# Patient Record
Sex: FEMALE | Race: WHITE | NOT HISPANIC OR LATINO | Employment: PART TIME | URBAN - METROPOLITAN AREA
[De-identification: names, ages, dates, MRNs, and addresses within clinical notes are randomized per-mention and may not be internally consistent; named-entity substitution may affect disease eponyms.]

---

## 2020-07-30 ENCOUNTER — OFFICE VISIT (OUTPATIENT)
Dept: URGENT CARE | Facility: CLINIC | Age: 30
End: 2020-07-30
Payer: COMMERCIAL

## 2020-07-30 VITALS
DIASTOLIC BLOOD PRESSURE: 61 MMHG | RESPIRATION RATE: 18 BRPM | OXYGEN SATURATION: 97 % | SYSTOLIC BLOOD PRESSURE: 99 MMHG | HEART RATE: 82 BPM | HEIGHT: 63 IN | TEMPERATURE: 97.7 F | BODY MASS INDEX: 22.79 KG/M2 | WEIGHT: 128.6 LBS

## 2020-07-30 DIAGNOSIS — N39.0 URINARY TRACT INFECTION WITHOUT HEMATURIA, SITE UNSPECIFIED: Primary | ICD-10-CM

## 2020-07-30 DIAGNOSIS — J30.9 ALLERGIC RHINITIS, UNSPECIFIED SEASONALITY, UNSPECIFIED TRIGGER: ICD-10-CM

## 2020-07-30 LAB
SL AMB  POCT GLUCOSE, UA: NEGATIVE
SL AMB LEUKOCYTE ESTERASE,UA: ABNORMAL
SL AMB POCT BILIRUBIN,UA: NEGATIVE
SL AMB POCT BLOOD,UA: ABNORMAL
SL AMB POCT CLARITY,UA: ABNORMAL
SL AMB POCT COLOR,UA: YELLOW
SL AMB POCT KETONES,UA: NEGATIVE
SL AMB POCT NITRITE,UA: NEGATIVE
SL AMB POCT PH,UA: 5
SL AMB POCT SPECIFIC GRAVITY,UA: 1.02
SL AMB POCT URINE PROTEIN: ABNORMAL
SL AMB POCT UROBILINOGEN: 0.2

## 2020-07-30 PROCEDURE — 81002 URINALYSIS NONAUTO W/O SCOPE: CPT | Performed by: PHYSICIAN ASSISTANT

## 2020-07-30 PROCEDURE — 87491 CHLMYD TRACH DNA AMP PROBE: CPT | Performed by: PHYSICIAN ASSISTANT

## 2020-07-30 PROCEDURE — 99213 OFFICE O/P EST LOW 20 MIN: CPT | Performed by: PHYSICIAN ASSISTANT

## 2020-07-30 PROCEDURE — S9088 SERVICES PROVIDED IN URGENT: HCPCS | Performed by: PHYSICIAN ASSISTANT

## 2020-07-30 PROCEDURE — 87591 N.GONORRHOEAE DNA AMP PROB: CPT | Performed by: PHYSICIAN ASSISTANT

## 2020-07-30 PROCEDURE — 87086 URINE CULTURE/COLONY COUNT: CPT | Performed by: PHYSICIAN ASSISTANT

## 2020-07-30 RX ORDER — TRAZODONE HYDROCHLORIDE 50 MG/1
100 TABLET ORAL
COMMUNITY

## 2020-07-30 RX ORDER — FLUTICASONE PROPIONATE 50 MCG
1 SPRAY, SUSPENSION (ML) NASAL DAILY
Qty: 1 BOTTLE | Refills: 0 | Status: SHIPPED | OUTPATIENT
Start: 2020-07-30

## 2020-07-30 RX ORDER — NITROFURANTOIN 25; 75 MG/1; MG/1
100 CAPSULE ORAL 2 TIMES DAILY
Qty: 14 CAPSULE | Refills: 0 | Status: SHIPPED | OUTPATIENT
Start: 2020-07-30 | End: 2020-08-06

## 2020-07-30 RX ORDER — LORATADINE 10 MG/1
10 TABLET ORAL DAILY
Qty: 30 TABLET | Refills: 0 | Status: SHIPPED | OUTPATIENT
Start: 2020-07-30

## 2020-07-30 RX ORDER — ARIPIPRAZOLE 10 MG/1
5 TABLET ORAL DAILY
COMMUNITY

## 2020-07-30 RX ORDER — FLUOXETINE HYDROCHLORIDE 40 MG/1
40 CAPSULE ORAL DAILY
COMMUNITY

## 2020-07-30 NOTE — PROGRESS NOTES
[facility]        NAME: Alexander Licea is a 30 y o  female  : 1990    MRN: [id number]  DATE: 2020  TIME: 5:46 PM    Assessment and Plan   Urinary tract infection without hematuria, site unspecified [N39 0]  1  Urinary tract infection without hematuria, site unspecified  POCT urine dip    Urine culture    Chlamydia/GC amplified DNA by PCR    nitrofurantoin (MACROBID) 100 mg capsule    fluticasone (Flonase) 50 mcg/act nasal spray    loratadine (CLARITIN) 10 mg tablet   2  Allergic rhinitis, unspecified seasonality, unspecified trigger       Patient Instructions     Take medicine as prescribed  Follow up with PCP in 3-5 days  Proceed to  ER if symptoms worsen  Chief Complaint     Chief Complaint   Patient presents with    Painful Urination     c/o of painful urination for the past few days  History of Present Illness       Urinary Tract Infection    This is a new problem  The current episode started yesterday  The problem occurs every urination  The problem has been gradually worsening  The quality of the pain is described as burning  The pain is moderate  There has been no fever  She is sexually active  There is no history of pyelonephritis  Pertinent negatives include no chills, discharge, flank pain, frequency, hematuria, hesitancy, nausea, possible pregnancy, sweats, urgency or vomiting  She has tried nothing for the symptoms  There is no history of catheterization, kidney stones, recurrent UTIs, a single kidney, urinary stasis or a urological procedure  Review of Systems   Review of Systems   Constitutional: Negative for activity change, appetite change, chills, diaphoresis, fatigue, fever and unexpected weight change  Respiratory: Negative for apnea, cough, choking, chest tightness, shortness of breath, wheezing and stridor  Cardiovascular: Negative for chest pain, palpitations and leg swelling  Gastrointestinal: Negative for nausea and vomiting     Genitourinary: Positive for dysuria  Negative for decreased urine volume, difficulty urinating, dyspareunia, enuresis, flank pain, frequency, genital sores, hematuria, hesitancy, menstrual problem, pelvic pain, urgency, vaginal bleeding, vaginal discharge and vaginal pain  Current Medications       Current Outpatient Medications:     ARIPiprazole (ABILIFY) 10 mg tablet, Take 10 mg by mouth daily, Disp: , Rfl:     FLUoxetine (PROzac) 40 MG capsule, Take 40 mg by mouth daily, Disp: , Rfl:     traZODone (DESYREL) 50 mg tablet, Take 50 mg by mouth daily at bedtime, Disp: , Rfl:     fluticasone (Flonase) 50 mcg/act nasal spray, 1 spray into each nostril daily, Disp: 1 Bottle, Rfl: 0    loratadine (CLARITIN) 10 mg tablet, Take 1 tablet (10 mg total) by mouth daily, Disp: 30 tablet, Rfl: 0    nitrofurantoin (MACROBID) 100 mg capsule, Take 1 capsule (100 mg total) by mouth 2 (two) times a day for 7 days, Disp: 14 capsule, Rfl: 0    Current Allergies     Allergies as of 07/30/2020 - Reviewed 07/30/2020   Allergen Reaction Noted    Amoxicillin Hives 07/30/2020    Clindamycin Hives 07/30/2020            The following portions of the patient's history were reviewed and updated as appropriate: allergies, current medications, past family history, past medical history, past social history, past surgical history and problem list      Past Medical History:   Diagnosis Date    Bipolar 1 disorder ([facility] [address] )     PMDD (premenstrual dysphoric disorder)     PTSD (post-traumatic stress disorder)        Past Surgical History:   Procedure Laterality Date    TUBAL LIGATION         Family History   Problem Relation Age of Onset    No Known Problems Mother     No Known Problems Father      Medications have been verified      Objective   BP 99/61   Pulse 82   Temp 97 7 °F (36 5 °C) (Oral)   Resp 18   Ht 5' 3" (1 6 m)   Wt 58 3 kg (128 lb 9 6 oz)   LMP 07/25/2020   SpO2 97%   BMI 22 78 kg/m²     Physical Exam     Physical Exam Constitutional: She appears well-developed and well-nourished  HENT:   Nose: Mucosal edema (bluish discoloration, boggy) and rhinorrhea (clear) present  Cardiovascular: Normal rate, regular rhythm and normal heart sounds  Exam reveals no gallop and no friction rub  No murmur heard  Pulmonary/Chest: Effort normal and breath sounds normal  No stridor  No respiratory distress  She has no wheezes  She has no rales  Abdominal: Soft  Bowel sounds are normal  She exhibits no distension and no mass  There is no tenderness  There is no guarding  Lymphadenopathy:     She has cervical adenopathy  Right cervical: Superficial cervical adenopathy present  Left cervical: Superficial cervical adenopathy present  16

## 2020-07-31 LAB
C TRACH DNA SPEC QL NAA+PROBE: NEGATIVE
N GONORRHOEA DNA SPEC QL NAA+PROBE: NEGATIVE

## 2020-08-01 LAB — BACTERIA UR CULT: NORMAL

## 2020-08-03 ENCOUNTER — TELEPHONE (OUTPATIENT)
Dept: URGENT CARE | Facility: CLINIC | Age: 30
End: 2020-08-03

## 2020-08-03 NOTE — TELEPHONE ENCOUNTER
Spoke to patient re negative urine culture and GC/ Chlamydia  Patient endorses slight dysuria, other sxs have resolved   Pt will f/u with PCP for further testing if sxs persist

## 2020-08-09 ENCOUNTER — HOSPITAL ENCOUNTER (INPATIENT)
Facility: HOSPITAL | Age: 30
LOS: 1 days | DRG: 817 | End: 2020-08-11
Attending: EMERGENCY MEDICINE | Admitting: INTERNAL MEDICINE
Payer: COMMERCIAL

## 2020-08-09 DIAGNOSIS — F41.9 ANXIETY: ICD-10-CM

## 2020-08-09 DIAGNOSIS — I95.2 DRUG-INDUCED HYPOTENSION: ICD-10-CM

## 2020-08-09 DIAGNOSIS — T50.902A SUICIDE ATTEMPT BY DRUG OVERDOSE (HCC): ICD-10-CM

## 2020-08-09 DIAGNOSIS — F32.A DEPRESSION, UNSPECIFIED DEPRESSION TYPE: ICD-10-CM

## 2020-08-09 DIAGNOSIS — T50.901A POLYSUBSTANCE OVERDOSE: Primary | ICD-10-CM

## 2020-08-09 PROBLEM — E87.6 HYPOKALEMIA: Status: ACTIVE | Noted: 2020-08-09

## 2020-08-09 PROBLEM — G93.40 ACUTE ENCEPHALOPATHY: Status: ACTIVE | Noted: 2020-08-09

## 2020-08-09 LAB
ALBUMIN SERPL BCP-MCNC: 3.6 G/DL (ref 3.5–5)
ALP SERPL-CCNC: 73 U/L (ref 46–116)
ALT SERPL W P-5'-P-CCNC: 24 U/L (ref 12–78)
ANION GAP SERPL CALCULATED.3IONS-SCNC: 12 MMOL/L (ref 4–13)
APAP SERPL-MCNC: <2 UG/ML (ref 10–20)
AST SERPL W P-5'-P-CCNC: 18 U/L (ref 5–45)
BASOPHILS # BLD AUTO: 0.05 THOUSANDS/ΜL (ref 0–0.1)
BASOPHILS NFR BLD AUTO: 1 % (ref 0–1)
BILIRUB SERPL-MCNC: 0.2 MG/DL (ref 0.2–1)
BUN SERPL-MCNC: 9 MG/DL (ref 5–25)
CALCIUM SERPL-MCNC: 8.9 MG/DL (ref 8.3–10.1)
CHLORIDE SERPL-SCNC: 108 MMOL/L (ref 100–108)
CO2 SERPL-SCNC: 26 MMOL/L (ref 21–32)
CREAT SERPL-MCNC: 0.68 MG/DL (ref 0.6–1.3)
EOSINOPHIL # BLD AUTO: 0.25 THOUSAND/ΜL (ref 0–0.61)
EOSINOPHIL NFR BLD AUTO: 3 % (ref 0–6)
ERYTHROCYTE [DISTWIDTH] IN BLOOD BY AUTOMATED COUNT: 13.3 % (ref 11.6–15.1)
ETHANOL SERPL-MCNC: 45 MG/DL (ref 0–3)
GFR SERPL CREATININE-BSD FRML MDRD: 117 ML/MIN/1.73SQ M
GLUCOSE SERPL-MCNC: 106 MG/DL (ref 65–140)
GLUCOSE SERPL-MCNC: 99 MG/DL (ref 65–140)
HCG SERPL QL: NEGATIVE
HCT VFR BLD AUTO: 41.1 % (ref 34.8–46.1)
HGB BLD-MCNC: 13.5 G/DL (ref 11.5–15.4)
IMM GRANULOCYTES # BLD AUTO: 0.02 THOUSAND/UL (ref 0–0.2)
IMM GRANULOCYTES NFR BLD AUTO: 0 % (ref 0–2)
LYMPHOCYTES # BLD AUTO: 2.14 THOUSANDS/ΜL (ref 0.6–4.47)
LYMPHOCYTES NFR BLD AUTO: 26 % (ref 14–44)
MCH RBC QN AUTO: 31.1 PG (ref 26.8–34.3)
MCHC RBC AUTO-ENTMCNC: 32.8 G/DL (ref 31.4–37.4)
MCV RBC AUTO: 95 FL (ref 82–98)
MONOCYTES # BLD AUTO: 0.61 THOUSAND/ΜL (ref 0.17–1.22)
MONOCYTES NFR BLD AUTO: 7 % (ref 4–12)
NEUTROPHILS # BLD AUTO: 5.15 THOUSANDS/ΜL (ref 1.85–7.62)
NEUTS SEG NFR BLD AUTO: 63 % (ref 43–75)
NRBC BLD AUTO-RTO: 0 /100 WBCS
PLATELET # BLD AUTO: 229 THOUSANDS/UL (ref 149–390)
PMV BLD AUTO: 9.6 FL (ref 8.9–12.7)
POTASSIUM SERPL-SCNC: 3.4 MMOL/L (ref 3.5–5.3)
PROT SERPL-MCNC: 6.8 G/DL (ref 6.4–8.2)
RBC # BLD AUTO: 4.34 MILLION/UL (ref 3.81–5.12)
SALICYLATES SERPL-MCNC: 3.7 MG/DL (ref 3–20)
SARS-COV-2 RNA RESP QL NAA+PROBE: NEGATIVE
SODIUM SERPL-SCNC: 146 MMOL/L (ref 136–145)
TROPONIN I SERPL-MCNC: <0.02 NG/ML
WBC # BLD AUTO: 8.22 THOUSAND/UL (ref 4.31–10.16)

## 2020-08-09 PROCEDURE — 93005 ELECTROCARDIOGRAM TRACING: CPT

## 2020-08-09 PROCEDURE — 99285 EMERGENCY DEPT VISIT HI MDM: CPT

## 2020-08-09 PROCEDURE — 80320 DRUG SCREEN QUANTALCOHOLS: CPT | Performed by: EMERGENCY MEDICINE

## 2020-08-09 PROCEDURE — 96360 HYDRATION IV INFUSION INIT: CPT

## 2020-08-09 PROCEDURE — 80329 ANALGESICS NON-OPIOID 1 OR 2: CPT | Performed by: EMERGENCY MEDICINE

## 2020-08-09 PROCEDURE — 99449 NTRPROF PH1/NTRNET/EHR 31/>: CPT | Performed by: EMERGENCY MEDICINE

## 2020-08-09 PROCEDURE — 99223 1ST HOSP IP/OBS HIGH 75: CPT | Performed by: ANESTHESIOLOGY

## 2020-08-09 PROCEDURE — 84484 ASSAY OF TROPONIN QUANT: CPT | Performed by: EMERGENCY MEDICINE

## 2020-08-09 PROCEDURE — 99285 EMERGENCY DEPT VISIT HI MDM: CPT | Performed by: EMERGENCY MEDICINE

## 2020-08-09 PROCEDURE — 80053 COMPREHEN METABOLIC PANEL: CPT | Performed by: EMERGENCY MEDICINE

## 2020-08-09 PROCEDURE — 82948 REAGENT STRIP/BLOOD GLUCOSE: CPT

## 2020-08-09 PROCEDURE — 96365 THER/PROPH/DIAG IV INF INIT: CPT

## 2020-08-09 PROCEDURE — 87635 SARS-COV-2 COVID-19 AMP PRB: CPT | Performed by: EMERGENCY MEDICINE

## 2020-08-09 PROCEDURE — 36415 COLL VENOUS BLD VENIPUNCTURE: CPT | Performed by: EMERGENCY MEDICINE

## 2020-08-09 PROCEDURE — 84703 CHORIONIC GONADOTROPIN ASSAY: CPT | Performed by: EMERGENCY MEDICINE

## 2020-08-09 PROCEDURE — 85025 COMPLETE CBC W/AUTO DIFF WBC: CPT | Performed by: EMERGENCY MEDICINE

## 2020-08-09 RX ORDER — HYDROXYZINE 50 MG/1
50 TABLET, FILM COATED ORAL 3 TIMES DAILY PRN
Status: ON HOLD | COMMUNITY
End: 2020-08-11 | Stop reason: SDUPTHER

## 2020-08-09 RX ORDER — SODIUM CHLORIDE, SODIUM GLUCONATE, SODIUM ACETATE, POTASSIUM CHLORIDE, MAGNESIUM CHLORIDE, SODIUM PHOSPHATE, DIBASIC, AND POTASSIUM PHOSPHATE .53; .5; .37; .037; .03; .012; .00082 G/100ML; G/100ML; G/100ML; G/100ML; G/100ML; G/100ML; G/100ML
1000 INJECTION, SOLUTION INTRAVENOUS ONCE
Status: COMPLETED | OUTPATIENT
Start: 2020-08-09 | End: 2020-08-09

## 2020-08-09 RX ORDER — SODIUM CHLORIDE, SODIUM GLUCONATE, SODIUM ACETATE, POTASSIUM CHLORIDE, MAGNESIUM CHLORIDE, SODIUM PHOSPHATE, DIBASIC, AND POTASSIUM PHOSPHATE .53; .5; .37; .037; .03; .012; .00082 G/100ML; G/100ML; G/100ML; G/100ML; G/100ML; G/100ML; G/100ML
125 INJECTION, SOLUTION INTRAVENOUS CONTINUOUS
Status: DISCONTINUED | OUTPATIENT
Start: 2020-08-09 | End: 2020-08-10

## 2020-08-09 RX ORDER — FLUTICASONE PROPIONATE 50 MCG
1 SPRAY, SUSPENSION (ML) NASAL DAILY
Status: DISCONTINUED | OUTPATIENT
Start: 2020-08-10 | End: 2020-08-11 | Stop reason: HOSPADM

## 2020-08-09 RX ORDER — CLONIDINE HYDROCHLORIDE 0.2 MG/1
0.2 TABLET ORAL
COMMUNITY

## 2020-08-09 RX ORDER — LORATADINE 10 MG/1
10 TABLET ORAL DAILY
Status: DISCONTINUED | OUTPATIENT
Start: 2020-08-10 | End: 2020-08-11 | Stop reason: HOSPADM

## 2020-08-09 RX ORDER — IBUPROFEN 800 MG/1
800 TABLET ORAL EVERY 6 HOURS PRN
COMMUNITY
End: 2020-08-11 | Stop reason: HOSPADM

## 2020-08-09 RX ORDER — POTASSIUM CHLORIDE 20 MEQ/1
40 TABLET, EXTENDED RELEASE ORAL EVERY 4 HOURS
Status: COMPLETED | OUTPATIENT
Start: 2020-08-09 | End: 2020-08-10

## 2020-08-09 RX ADMIN — SODIUM CHLORIDE 1000 ML: 0.9 INJECTION, SOLUTION INTRAVENOUS at 20:38

## 2020-08-09 RX ADMIN — POTASSIUM CHLORIDE 40 MEQ: 1500 TABLET, EXTENDED RELEASE ORAL at 23:28

## 2020-08-09 RX ADMIN — SODIUM CHLORIDE 1000 ML: 0.9 INJECTION, SOLUTION INTRAVENOUS at 20:14

## 2020-08-09 RX ADMIN — SODIUM CHLORIDE, SODIUM GLUCONATE, SODIUM ACETATE, POTASSIUM CHLORIDE, MAGNESIUM CHLORIDE, SODIUM PHOSPHATE, DIBASIC, AND POTASSIUM PHOSPHATE 125 ML/HR: .53; .5; .37; .037; .03; .012; .00082 INJECTION, SOLUTION INTRAVENOUS at 20:57

## 2020-08-09 RX ADMIN — SODIUM CHLORIDE, SODIUM GLUCONATE, SODIUM ACETATE, POTASSIUM CHLORIDE, MAGNESIUM CHLORIDE, SODIUM PHOSPHATE, DIBASIC, AND POTASSIUM PHOSPHATE 1000 ML: .53; .5; .37; .037; .03; .012; .00082 INJECTION, SOLUTION INTRAVENOUS at 23:04

## 2020-08-10 LAB
ANION GAP SERPL CALCULATED.3IONS-SCNC: 5 MMOL/L (ref 4–13)
ATRIAL RATE: 67 BPM
BILIRUB UR QL STRIP: NEGATIVE
BUN SERPL-MCNC: 7 MG/DL (ref 5–25)
CALCIUM SERPL-MCNC: 7 MG/DL (ref 8.3–10.1)
CHLORIDE SERPL-SCNC: 111 MMOL/L (ref 100–108)
CLARITY UR: CLEAR
CO2 SERPL-SCNC: 28 MMOL/L (ref 21–32)
COLOR UR: YELLOW
CREAT SERPL-MCNC: 0.58 MG/DL (ref 0.6–1.3)
GFR SERPL CREATININE-BSD FRML MDRD: 123 ML/MIN/1.73SQ M
GLUCOSE SERPL-MCNC: 103 MG/DL (ref 65–140)
GLUCOSE UR STRIP-MCNC: NEGATIVE MG/DL
HGB UR QL STRIP.AUTO: NEGATIVE
KETONES UR STRIP-MCNC: NEGATIVE MG/DL
LEUKOCYTE ESTERASE UR QL STRIP: NEGATIVE
MAGNESIUM SERPL-MCNC: 2.2 MG/DL (ref 1.6–2.6)
NITRITE UR QL STRIP: NEGATIVE
P AXIS: 58 DEGREES
PH UR STRIP.AUTO: 6 [PH]
PHOSPHATE SERPL-MCNC: 2.5 MG/DL (ref 2.7–4.5)
POTASSIUM SERPL-SCNC: 3.6 MMOL/L (ref 3.5–5.3)
PR INTERVAL: 160 MS
PROT UR STRIP-MCNC: NEGATIVE MG/DL
QRS AXIS: 90 DEGREES
QRSD INTERVAL: 82 MS
QT INTERVAL: 386 MS
QTC INTERVAL: 407 MS
SODIUM SERPL-SCNC: 144 MMOL/L (ref 136–145)
SP GR UR STRIP.AUTO: >=1.03 (ref 1–1.03)
T WAVE AXIS: 63 DEGREES
UROBILINOGEN UR QL STRIP.AUTO: 0.2 E.U./DL
VENTRICULAR RATE: 67 BPM

## 2020-08-10 PROCEDURE — 83735 ASSAY OF MAGNESIUM: CPT | Performed by: NURSE PRACTITIONER

## 2020-08-10 PROCEDURE — 99233 SBSQ HOSP IP/OBS HIGH 50: CPT | Performed by: NURSE PRACTITIONER

## 2020-08-10 PROCEDURE — 93010 ELECTROCARDIOGRAM REPORT: CPT | Performed by: INTERNAL MEDICINE

## 2020-08-10 PROCEDURE — 80048 BASIC METABOLIC PNL TOTAL CA: CPT | Performed by: NURSE PRACTITIONER

## 2020-08-10 PROCEDURE — 81003 URINALYSIS AUTO W/O SCOPE: CPT | Performed by: NURSE PRACTITIONER

## 2020-08-10 PROCEDURE — G0425 INPT/ED TELECONSULT30: HCPCS | Performed by: PSYCHIATRY & NEUROLOGY

## 2020-08-10 PROCEDURE — 84100 ASSAY OF PHOSPHORUS: CPT | Performed by: NURSE PRACTITIONER

## 2020-08-10 RX ORDER — CALCIUM GLUCONATE 20 MG/ML
1 INJECTION, SOLUTION INTRAVENOUS ONCE
Status: COMPLETED | OUTPATIENT
Start: 2020-08-10 | End: 2020-08-10

## 2020-08-10 RX ADMIN — LORATADINE 10 MG: 10 TABLET ORAL at 08:33

## 2020-08-10 RX ADMIN — CALCIUM GLUCONATE 1 G: 20 INJECTION, SOLUTION INTRAVENOUS at 08:33

## 2020-08-10 RX ADMIN — Medication 2 TABLET: at 21:05

## 2020-08-10 RX ADMIN — Medication 2 TABLET: at 08:33

## 2020-08-10 RX ADMIN — POTASSIUM CHLORIDE 40 MEQ: 1500 TABLET, EXTENDED RELEASE ORAL at 04:27

## 2020-08-10 RX ADMIN — Medication 2 TABLET: at 18:11

## 2020-08-10 RX ADMIN — Medication 2 TABLET: at 12:53

## 2020-08-10 NOTE — CONSULTS
PHONE olegarioprincess 1980 Toxicology  Robby Dooley 30 y o  female MRN: [id number]  Unit/Bed#: ED 22 Encounter: [id number]      Reason for Consult / Principal Problem: Overdose  Consults  08/09/20      ASSESSMENT:  1) Hypotension  2) Trazodone overdose  3) Aripiprazole overdose  4) Drowsiness  5) Self harm attempt    RECOMMENDATIONS:  The patient presented for evaluation after stated trazodone in aripiprazole overdose  She has had hypotension which has been well responsive to IV fluid boluses  Both trazodone and aripiprazole cause peripheral alpha-1 antagonism leading to vasodilation and hypotension  The both additionally cause drowsiness  Therefore the patient's clinical picture is consistent with overdose on these agents  I would recommend continuing to give IV fluid boluses for hypotension  It is very rare for patients to require pressors after overdose on either of these agents as they are generally very well responsive to IV fluids  However if patient has symptomatic hypotension unresponsive to IV fluid boluses, I would recommend starting epinephrine or norepinephrine  As I wouldn't expect need for high doses or long duration of administration, it would be reasonable to run pressor through a large peripheral IV  Patient should be admitted overnight to [facility] with telemetry monitoring  She can be medically cleared from toxicology perspective when she is back to baseline mental status with normal vital signs and exam       For further questions, please call [facility]  Service or [facility] to reach the medical  on call  Hx and PE limited by the dynamics of a phone consultation  I have not personally interviewed or evaluated the patient, but only advised based on the information provided to me  Primary provider is responsible for all clinical decisions  Pertinent history, physical exam and clinical findings and course discussed: Robby Dooley is a 30 y o  year old female who presents with overdose  The patient presented stating that she had overdosed on both her aripiprazole and trazodone at unknown time today  She has been noted to be drowsy and has also had some mild hypotension well responsive to IV fluids  Review of systems and physical exam not performed by me  Historical Information   Past Medical History:   Diagnosis Date    B[facility] disorder [facility] [address] )     PMDD (premenstrual dysphoric disorder)     PTSD (post-traumatic stress disorder)      Past Surgical History:   Procedure Laterality Date    TUBAL LIGATION       Social History   Social History     Substance and Sexual Activity   Alcohol Use Yes    Frequency: Monthly or less     Social History     Substance and Sexual Activity   Drug Use Never     Social History     Tobacco Use   Smoking Status Never Smoker   Smokeless Tobacco Never Used     Family History   Problem Relation Age of Onset    No Known Problems Mother     No Known Problems Father         Prior to Admission medications    Medication Sig Start Date End Date Taking?  Authorizing Provider   ARIPiprazole (ABILIFY) 10 mg tablet Take 10 mg by mouth daily    Historical Provider, MD   FLUoxetine (PROzac) 40 MG capsule Take 40 mg by mouth daily    Historical Provider, MD   fluticasone (Flonase) 50 mcg/act nasal spray 1 spray into each nostril daily 7/30/20   Kevin Anderson PA-C   loratadine (CLARITIN) 10 mg tablet Take 1 tablet (10 mg total) by mouth daily 7/30/20   Kevin Anderson PA-C   traZODone (DESYREL) 50 mg tablet Take 50 mg by mouth daily at bedtime    Historical Provider, MD       Current Facility-Administered Medications   Medication Dose Route Frequency    multi-electrolyte (PLASMALYTE-A/ISOLYTE-S PH 7 4) IV solution  125 mL/hr Intravenous Continuous    sodium chloride 0 9 % bolus 1,000 mL  1,000 mL Intravenous Once    sodium chloride 0 9 % bolus 1,000 mL  1,000 mL Intravenous Once       Allergies   Allergen Reactions    Amoxicillin Hives    Clindamycin Hives       Objective     No intake or output data in the 24 hours ending 08/09/20 2058    Invasive Devices:   Peripheral IV 08/09/20 Left Antecubital (Active)   Site Assessment Clean;Dry; Intact 08/09/20 2013   Dressing Type Transparent 08/09/20 2013   Line Status Blood return noted 08/09/20 2013   Dressing Status Clean;Dry; Intact 08/09/20 2013       Peripheral IV 08/09/20 Right Forearm (Active)   Site Assessment Clean;Dry; Intact 08/09/20 2024   Dressing Type Transparent 08/09/20 2024   Line Status Blood return noted; Flushed 08/09/20 2024   Dressing Status Clean;Dry; Intact 08/09/20 2024       Vitals   Vitals:    08/09/20 1956 08/09/20 2015 08/09/20 2027   BP: 90/53 (!) 87/50 98/53   TempSrc: Oral     Pulse: 77 68 73   Resp: 15 17 12   Patient Position - Orthostatic VS: Lying Lying    Temp: 98 1 °F (36 7 °C)           Lab Results: I have personally reviewed pertinent reports  Labs:  Results from last 7 days   Lab Units 08/09/20 2030   WBC Thousand/uL 8 22   HEMOGLOBIN g/dL 13 5   HEMATOCRIT % 41 1   PLATELETS Thousands/uL 229   NEUTROS PCT % 63   LYMPHS PCT % 26   MONOS PCT % 7      Results from last 7 days   Lab Units 08/09/20 2041   POTASSIUM mmol/L 3 4*   CHLORIDE mmol/L 108   CO2 mmol/L 26   BUN mg/dL 9   CREATININE mg/dL 0 68   CALCIUM mg/dL 8 9   ALK PHOS U/L 73   ALT U/L 24   AST U/L 18              0   Lab Value Date/Time    TROPONINI <0 02 08/09/2020 2041         Results from last 7 days   Lab Units 08/09/20 2041   ACETAMINOPHEN LVL ug/mL <2 0*   ETHANOL LVL mg/dL 45*   SALICYLATE LVL mg/dL 3 7           Counseling / Coordination of Care  Total time spent today 31 minutes  This was a phone consultation

## 2020-08-10 NOTE — ASSESSMENT & PLAN NOTE
· Continue fluid support  · Follow QTc  · Appreciate toxicology recommendations  · Psychiatry consult in AM

## 2020-08-10 NOTE — ED PROVIDER NOTES
History  Chief Complaint   Patient presents with    Psychiatric Evaluation     EMS reports that mother called police in regards to "suicidal text messages sent" to her by pt  in those text messages, pt reports "wanting to end life" pt admits to drinking an "entire bottle of children decongestant" pt states to have taken "3 trazadone and a bunch of other pills" pt +ETOH    Overdose - Intentional     pt took mulitple "uknown pills" reports 3 trazadone, 6/7 ambilify, bottole of childrens decongestant + ETOH     Patient is a 20-year-old female with past medical history bipolar disorder, PTSD, presents to the emergency department by ambulance for an intentional overdose as well as texting suicidal messages to her mother  EMS reported that mother called the police in regards to suicidal text messages that worsened by her daughter which is the patient  Patient reported wanting to end her life," and admitted to drinking an entire bottle of children's decongestant medication as well as taking 3 trazodone 50 mg tablets and a bunch of other pills " On further questioning, she admits to taking 6-7 pills of her Abilify 10 mg tablets  Patient also admits to drinking alcohol  On arrival to the ED, patient sleepy but arousable  She states she is feeling very tired but otherwise denies any pain  Review of systems unreliable but otherwise negative  Patient will not comment on if she is feeling suicidal or if this was an intentional overdose  History provided by:  Patient and EMS personnel   used: No        Prior to Admission Medications   Prescriptions Last Dose Informant Patient Reported? Taking?    ARIPiprazole (ABILIFY) 10 mg tablet 8/9/2020 at Unknown time Pharmacy (Specify) Yes Yes   Sig: Take 5 mg by mouth daily    FLUoxetine (PROzac) 40 MG capsule Unknown at Unknown time  Yes No   Sig: Take 40 mg by mouth daily   cloNIDine (CATAPRES) 0 2 mg tablet 8/9/2020 at Unknown time  Yes Yes   Sig: Take 0 2 mg by mouth daily at bedtime   fluticasone (Flonase) 50 mcg/act nasal spray Unknown at Unknown time  No No   Si spray into each nostril daily   hydrOXYzine HCL (ATARAX) 50 mg tablet 2020 at Unknown time Pharmacy (Specify) Yes Yes   Sig: Take 50 mg by mouth 3 (three) times a day as needed for itching   ibuprofen (MOTRIN) 800 mg tablet 2020 at Unknown time  Yes Yes   Sig: Take 800 mg by mouth every 6 (six) hours as needed for mild pain   loratadine (CLARITIN) 10 mg tablet Unknown at Unknown time  No No   Sig: Take 1 tablet (10 mg total) by mouth daily   traZODone (DESYREL) 50 mg tablet 2020 at Unknown time  Yes Yes   Sig: Take 100 mg by mouth daily at bedtime       Facility-Administered Medications: None       Past Medical History:   Diagnosis Date    Bipolar 1 disorder (HCC)     PMDD (premenstrual dysphoric disorder)     PTSD (post-traumatic stress disorder)        Past Surgical History:   Procedure Laterality Date    TUBAL LIGATION         Family History   Problem Relation Age of Onset    No Known Problems Mother     No Known Problems Father      I have reviewed and agree with the history as documented  E-Cigarette/Vaping    E-Cigarette Use Current Every Day User      E-Cigarette/Vaping Substances    Nicotine No     THC No     CBD No     Flavoring No     Other No     Unknown No      Social History     Tobacco Use    Smoking status: Never Smoker    Smokeless tobacco: Never Used   Substance Use Topics    Alcohol use: Yes     Frequency: Monthly or less    Drug use: Never       Review of Systems   Unable to perform ROS: Mental status change   Constitutional: Positive for fatigue  Negative for chills and fever  HENT: Negative for congestion, ear pain, rhinorrhea and sore throat  Respiratory: Negative for cough and shortness of breath  Cardiovascular: Negative for chest pain and palpitations  Gastrointestinal: Negative for abdominal pain, diarrhea, nausea and vomiting  Genitourinary: Negative for dysuria, frequency and hematuria  Musculoskeletal: Negative for back pain and neck pain  Skin: Negative for color change, rash and wound  Neurological: Negative for dizziness, seizures, syncope, weakness, light-headedness and headaches  Hematological: Negative for adenopathy  Does not bruise/bleed easily  Psychiatric/Behavioral: Positive for suicidal ideas  Negative for confusion and decreased concentration  Physical Exam  Physical Exam  Vitals signs and nursing note reviewed  Constitutional:       General: She is not in acute distress  Appearance: Normal appearance  She is well-developed  She is not ill-appearing or diaphoretic  Comments: Patient presents very sleepy and lethargic but is arousable to verbal stimuli  No external signs of trauma  HENT:      Head: Normocephalic and atraumatic  Right Ear: External ear normal       Left Ear: External ear normal       Nose: Nose normal       Mouth/Throat:      Mouth: Mucous membranes are moist       Pharynx: Oropharynx is clear  Eyes:      Extraocular Movements: Extraocular movements intact  Conjunctiva/sclera: Conjunctivae normal       Comments: Pupils pinpoint, reactive to light bilaterally  Neck:      Musculoskeletal: Normal range of motion and neck supple  No neck rigidity  Vascular: No JVD  Cardiovascular:      Rate and Rhythm: Normal rate and regular rhythm  Pulses: Normal pulses  Heart sounds: Normal heart sounds  No murmur  No friction rub  No gallop  Pulmonary:      Effort: Pulmonary effort is normal  No respiratory distress  Breath sounds: Normal breath sounds  No wheezing or rales  Abdominal:      General: Bowel sounds are normal  There is no distension  Palpations: Abdomen is soft  Tenderness: There is no abdominal tenderness  There is no guarding or rebound  Musculoskeletal: Normal range of motion           General: No tenderness, deformity or signs of injury  Lymphadenopathy:      Cervical: No cervical adenopathy  Skin:     General: Skin is warm and dry  Coloration: Skin is not pale  Findings: No erythema or rash  Neurological:      General: No focal deficit present  Mental Status: She is alert and oriented to person, place, and time  Sensory: No sensory deficit  Comments: 4/5 strength in all 4 extremities  Psychiatric:      Comments: Unable to assess at this time given patient's current mental status from recent overdose           Vital Signs  ED Triage Vitals   Temperature Pulse Respirations Blood Pressure SpO2   08/09/20 1956 08/09/20 1956 08/09/20 1956 08/09/20 1956 08/09/20 1956   98 1 °F (36 7 °C) 77 15 90/53 96 %      Temp Source Heart Rate Source Patient Position - Orthostatic VS BP Location FiO2 (%)   08/09/20 1956 08/09/20 1956 08/09/20 1956 08/09/20 1956 --   Oral Monitor Lying Right arm       Pain Score       08/09/20 2300       No Pain         Vitals:    08/10/20 2301 08/11/20 0600 08/11/20 0748 08/11/20 1500   BP: 98/64  113/71 119/67   BP Location:    Left arm   Pulse: 55  56 68   Resp: 16  18 18   Temp: 98 2 °F (36 8 °C)   99 3 °F (37 4 °C)   TempSrc:    Oral   SpO2: 96%  96% 97%   Weight:  65 3 kg (143 lb 15 4 oz)     Height:           Visual Acuity  Visual Acuity      Most Recent Value   L Pupil Size (mm)  3   R Pupil Size (mm)  3   L Pupil Shape  Round   R Pupil Shape  Round          ED Medications  Medications   sodium chloride 0 9 % bolus 1,000 mL (0 mL Intravenous Stopped 8/9/20 2123)   sodium chloride 0 9 % bolus 1,000 mL (0 mL Intravenous Stopped 8/9/20 2144)   multi-electrolyte (ISOLYTE-S PH 7 4) bolus 1,000 mL (0 mL Intravenous Stopped 8/9/20 2349)   potassium chloride (K-DUR,KLOR-CON) CR tablet 40 mEq (40 mEq Oral Given 8/10/20 0427)   calcium gluconate 1 g in sodium chloride 0 9% 50 mL (premix) (0 g Intravenous Stopped 8/10/20 1000)   potassium-sodium phosphateS (K-PHOS,PHOSPHA 250) -432 mg tablet 2 tablet (2 tablets Oral Given 8/10/20 2105)       Diagnostic Studies  Results Reviewed     Procedure Component Value Units Date/Time    UA (URINE) with reflex to Scope [[id number]] Collected:  08/10/20 0558    Lab Status:  Final result Specimen:  Urine, Clean Catch Updated:  08/10/20 0615     Color, UA Yellow     Clarity, UA Clear     Specific Gravity, UA >=1 030     pH, UA 6 0     Leukocytes, UA Negative     Nitrite, UA Negative     Protein, UA Negative mg/dl      Glucose, UA Negative mg/dl      Ketones, UA Negative mg/dl      Urobilinogen, UA 0 2 E U /dl      Bilirubin, UA Negative     Blood, UA Negative    Basic metabolic panel [[id number]]  (Abnormal) Collected:  08/10/20 0432    Lab Status:  Final result Specimen:  Blood from Hand, Left Updated:  08/10/20 0500     Sodium 144 mmol/L      Potassium 3 6 mmol/L      Chloride 111 mmol/L      CO2 28 mmol/L      ANION GAP 5 mmol/L      BUN 7 mg/dL      Creatinine 0 58 mg/dL      Glucose 103 mg/dL      Calcium 7 0 mg/dL      eGFR 123 ml/min/1 73sq m     Narrative:       [facility] guidelines for Chronic Kidney Disease (CKD):     Stage 1 with normal or high GFR (GFR > 90 mL/min/1 73 square meters)    Stage 2 Mild CKD (GFR = 60-89 mL/min/1 73 square meters)    Stage 3A Moderate CKD (GFR = 45-59 mL/min/1 73 square meters)    Stage 3B Moderate CKD (GFR = 30-44 mL/min/1 73 square meters)    Stage 4 Severe CKD (GFR = 15-29 mL/min/1 73 square meters)    Stage 5 End Stage CKD (GFR <15 mL/min/1 73 square meters)  Note: GFR calculation is accurate only with a steady state creatinine    Magnesium [[id number]]  (Normal) Collected:  08/10/20 0432    Lab Status:  Final result Specimen:  Blood from Hand, Left Updated:  08/10/20 0500     Magnesium 2 2 mg/dL     Phosphorus [[id number]]  (Abnormal) Collected:  08/10/20 0432    Lab Status:  Final result Specimen:  Blood from Hand, Left Updated:  08/10/20 0500     Phosphorus 2 5 mg/dL     [facility] [[id number]]  (Normal) Collected:  08/09/20 2014    Lab Status:  Final result Specimen:  Nares from Nose Updated:  08/09/20 2143     SARS-CoV-2 Negative    Narrative: The specimen collection materials, transport medium, and/or testing methodology utilized in the production of these test results have been proven to be reliable in a limited validation with an abbreviated program under the Emergency Utilization Authorization provided by the FDA  Testing reported as "Presumptive positive" will be confirmed with secondary testing with a reference laboratory to ensure result accuracy  Clinical caution and judgement should be used with the interpretation of these results with consideration of the clinical impression and other laboratory testing  Testing reported as "Positive" or "Negative" has been proven to be accurate according to standard laboratory validation requirements  All testing is performed with control materials showing appropriate reactivity at standard intervals        Comprehensive metabolic panel [[id number]]  (Abnormal) Collected:  08/09/20 2041    Lab Status:  Final result Specimen:  Blood Updated:  08/09/20 2051     Sodium 146 mmol/L      Potassium 3 4 mmol/L      Chloride 108 mmol/L      CO2 26 mmol/L      ANION GAP 12 mmol/L      BUN 9 mg/dL      Creatinine 0 68 mg/dL      Glucose 106 mg/dL      Calcium 8 9 mg/dL      AST 18 U/L      ALT 24 U/L      Alkaline Phosphatase 73 U/L      Total Protein 6 8 g/dL      Albumin 3 6 g/dL      Total Bilirubin 0 20 mg/dL      eGFR 117 ml/min/1 73sq m     Narrative:       [facility] guidelines for Chronic Kidney Disease (CKD):     Stage 1 with normal or high GFR (GFR > 90 mL/min/1 73 square meters)    Stage 2 Mild CKD (GFR = 60-89 mL/min/1 73 square meters)    Stage 3A Moderate CKD (GFR = 45-59 mL/min/1 73 square meters)    Stage 3B Moderate CKD (GFR = 30-44 mL/min/1 73 square meters)    Stage 4 Severe CKD (GFR = 15-29 mL/min/1 73 square meters)    Stage 5 End Stage CKD (GFR <15 mL/min/1 73 square meters)  Note: GFR calculation is accurate only with a steady state creatinine    Ethanol [[id number]]  (Abnormal) Collected:  08/09/20 2041    Lab Status:  Final result Specimen:  Blood Updated:  08/09/20 2051     Ethanol Lvl 45 mg/dL     Fingerstick Glucose (POCT) [[id number]]  (Normal) Collected:  08/09/20 2006    Lab Status:  Final result Updated:  08/09/20 2045     POC Glucose 99 mg/dl     hCG, qualitative pregnancy [[id number]]  (Normal) Collected:  08/09/20 2041    Lab Status:  Final result Specimen:  Blood Updated:  08/09/20 2045     Preg, Serum Negative    Salicylate level [[id number]]  (Normal) Collected:  08/09/20 2041    Lab Status:  Final result Specimen:  Blood Updated:  72/63/17 6739     Salicylate Lvl 3 7 mg/dL     Acetaminophen level-If concentration is detectable, please discuss with medical  on call   [[id number]]  (Abnormal) Collected:  08/09/20 2041    Lab Status:  Final result Specimen:  Blood Updated:  08/09/20 2045     Acetaminophen Level <2 0 ug/mL     Troponin I [[id number]]  (Normal) Collected:  08/09/20 2041    Lab Status:  Final result Specimen:  Blood Updated:  08/09/20 2042     Troponin I <0 02 ng/mL     CBC and differential [[id number]] Collected:  08/09/20 2030    Lab Status:  Final result Specimen:  Blood Updated:  08/09/20 2031     WBC 8 22 Thousand/uL      RBC 4 34 Million/uL      Hemoglobin 13 5 g/dL      Hematocrit 41 1 %      MCV 95 fL      MCH 31 1 pg      MCHC 32 8 g/dL      RDW 13 3 %      MPV 9 6 fL      Platelets 705 Thousands/uL      nRBC 0 /100 WBCs      Neutrophils Relative 63 %      Immat GRANS % 0 %      Lymphocytes Relative 26 %      Monocytes Relative 7 %      Eosinophils Relative 3 %      Basophils Relative 1 %      Neutrophils Absolute 5 15 Thousands/µL      Immature Grans Absolute 0 02 Thousand/uL      Lymphocytes Absolute 2 14 Thousands/µL      Monocytes Absolute 0 61 Thousand/µL      Eosinophils Absolute 0 25 Thousand/µL      Basophils Absolute 0 05 Thousands/µL                  No orders to display              Procedures  ECG 12 Lead Documentation Only    Date/Time: 8/9/2020 8:32 PM  Performed by: Desi Rey DO  Authorized by: Desi Rey DO     ECG reviewed by me, the ED Provider: yes    Patient location:  ED  Previous ECG:     Previous ECG:  Unavailable  Rate:     ECG rate:  67    ECG rate assessment: normal    Rhythm:     Rhythm: sinus rhythm    Ectopy:     Ectopy: none    QRS:     QRS axis:  Right    QRS intervals:  Normal  Conduction:     Conduction: normal    ST segments:     ST segments:  Normal  T waves:     T waves: normal    Other findings:     Other findings: LAE    Comments:      QT/QTc: 386 / 407 ms    CriticalCare Time  Performed by: Desi Rey DO  Authorized by: Desi Rey DO     Critical care provider statement:     Critical care time (minutes):  60    Critical care time was exclusive of:  Separately billable procedures and treating other patients and teaching time    Critical care was necessary to treat or prevent imminent or life-threatening deterioration of the following conditions:  Circulatory failure, CNS failure or compromise and toxidrome    Critical care was time spent personally by me on the following activities:  Blood draw for specimens, obtaining history from patient or surrogate, evaluation of patient's response to treatment, discussions with consultants, examination of patient, re-evaluation of patient's condition, ordering and review of laboratory studies, ordering and performing treatments and interventions, interpretation of cardiac output measurements and development of treatment plan with patient or surrogate    I assumed direction of critical care for this patient from another provider in my specialty: no               ED Course  ED Course as of Aug 13 0928   [place] Aug 09, 2020   2031 [facility] texted   Juana Queen, on-call   Awaiting call back  2031 Patient's blood pressure improving with IV fluids  She is still arousable and answering questions  She still appears very sleepy  2040 Spoke with Dr Peewee Link over the phone who recommended fluid hydration as medications that she supposedly took will cause vasodilation, however this is usually responsive to IV fluid boluses  He states very rarely to patient's need a small dose of norepinephrine for continued hypotension but this is rare  Given the amount of medications that she took and how long they will likely be in her system, he recommended admitting to either step down or med surge telemetry  2046 ACETAMINOPHEN LEVEL(!): <2 0   2110 Patient keeps dropping pressure to just below 90 systolic  3rd liter of fluids was just hung and I advised nurse to hang it wide open  Consulted with Critical Care attending, Dr Emani Holt, who thinks this is also appropriate for step down and will send PA down to evaluate patient  MDM  Number of Diagnoses or Management Options  Drug-induced hypotension:   Polysubstance overdose:   Suicide attempt by drug overdose [facility]):   Diagnosis management comments: 47-year-old female presents with suicide attempt by polysubstance overdose  Patient took trazodone, Abilify, children's decongestant medication and alcohol  It is unclear if she took any other medications  Will check basic labs, EKG, coma panel, UDS and pregnancy test   Will start IV fluid resuscitation for borderline hypotension  Will consult toxicology  Patient not cleared medically at this time and will await medical clearance prior to consulting crisis worker however patient will be put on a 1-1 for suicidality and will need inpatient admission whether by 201 or 302          Amount and/or Complexity of Data Reviewed  Clinical lab tests: ordered and reviewed  Tests in the medicine section of CPT®: ordered and reviewed  Obtain history from someone other than the patient: yes  Discuss the patient with other providers: yes (Dr Esther Vargas (tox), Dr Ramos Prather (CCM))  Independent visualization of images, tracings, or specimens: yes          Disposition  Final diagnoses:   Polysubstance overdose   Suicide attempt by drug overdose ([facility] [address] )   Drug-induced hypotension     Time reflects when diagnosis was documented in both MDM as applicable and the Disposition within this note     Time User Action Codes Description Comment    8/9/2020  8:38 PM [facility] Polysubstance overdose     8/9/2020  8:38 PM [place], [address] [facility] Suicide attempt by drug overdose ([facility] [address] )     8/9/2020  9:31 PM Benjiman Cools E Add [I95 2] Drug-induced hypotension     8/9/2020 10:08 PM Rowan [place] Modify [T50 901A] Polysubstance overdose     8/9/2020 10:08 PM Rowan [place] Add [F32 9] Depression, unspecified depression type     8/9/2020 10:08 PM Rowan Janae Modify [T50 901A] Polysubstance overdose     8/9/2020 10:08 PM Rowan [place] Modify [T50 901A] Polysubstance overdose     8/11/2020  7:12 PM Ivory Maty Add [F41 9] Anxiety       ED Disposition     ED Disposition Condition Date/Time Comment    Admit Stable Sun Aug 9, 2020  9:32 PM Case was discussed with Critical Care and the patient's admission status was agreed to be Admission Status: observation status to the service of Dr Ramos Prather          Follow-up Information    None         Discharge Medication List as of 8/11/2020  7:14 PM      CONTINUE these medications which have CHANGED    Details   hydrOXYzine HCL (ATARAX) 50 mg tablet Take 0 5 tablets (25 mg total) by mouth every 8 (eight) hours as needed for anxiety, Starting Tue 8/11/2020, No Print         CONTINUE these medications which have NOT CHANGED    Details   ARIPiprazole (ABILIFY) 10 mg tablet Take 5 mg by mouth daily , Historical Med      cloNIDine (CATAPRES) 0 2 mg tablet Take 0 2 mg by mouth daily at bedtime, Historical Med      traZODone (DESYREL) 50 mg tablet Take 100 mg by mouth daily at bedtime , Historical Med      FLUoxetine (PROzac) 40 MG capsule Take 40 mg by mouth daily, Historical Med      fluticasone (Flonase) 50 mcg/act nasal spray 1 spray into each nostril daily, Starting Thu 7/30/2020, Normal      loratadine (CLARITIN) 10 mg tablet Take 1 tablet (10 mg total) by mouth daily, Starting Thu 7/30/2020, Normal         STOP taking these medications       ibuprofen (MOTRIN) 800 mg tablet Comments:   Reason for Stopping:             No discharge procedures on file      PDMP Review     None          ED Provider  Electronically Signed by           Eugene Hassan DO  08/13/20 7853

## 2020-08-10 NOTE — PROGRESS NOTES
Progress Note - Ally Hernandez 1990, 30 y o  female MRN: [id number]    Unit/Bed#:  Encounter: [id number]    Primary Care Provider: No primary care provider on file  Date and time admitted to hospital: 2020  7:45 PM    Hypokalemia  Assessment & Plan  · Replete and recheck in AM    Depression  Assessment & Plan  · Hold home medications at present  · Psychiatry consult in AM    Acute encephalopathy  Assessment & Plan  · Resolved  · Neuro exams as needed    * Polysubstance overdose  Assessment & Plan  · Continue fluid support  · Follow QTc  · Appreciate toxicology recommendations  · Psychiatry consult in AM      ----------------------------------------------------------------------------------------  HPI/24hr events: Patient with blood pressures in 80s-90s, neurologically appropriate    Disposition: Improving  Code Status: Level 1 - Full Code  ---------------------------------------------------------------------------------------  SUBJECTIVE  "I'm ok"    Review of Systems   Constitutional: Negative for fatigue and fever  HENT: Negative for trouble swallowing  Eyes: Negative for visual disturbance  Respiratory: Negative for shortness of breath  Cardiovascular: Negative for chest pain  Gastrointestinal: Negative for nausea and vomiting  Musculoskeletal: Negative for arthralgias  Neurological: Negative for light-headedness  Psychiatric/Behavioral: Negative for confusion, self-injury and suicidal ideas         ---------------------------------------------------------------------------------------  OBJECTIVE    Vitals   Vitals:    20 2156 20 2300 08/10/20 0000 08/10/20 0300   BP: 109/60 (!) 85/50 (!) 87/48 91/50   BP Location:  Right arm  Right arm   Pulse: 82 60 60 55   Resp: 16 16  (!) 23   Temp:    98 °F (36 7 °C)   TempSrc:    Oral   SpO2: 98% 98% 92% 93%   Weight:  65 5 kg (144 lb 6 4 oz)     Height:  5' 3" (1 6 m)       Temp (24hrs), Av 1 °F (36 7 °C), Min:98 °F (36 7 °C), Max:98 1 °F (36 7 °C)  Current: Temperature: 98 °F (36 7 °C)          Respiratory:  SpO2: SpO2: 93 %, SpO2 Activity: SpO2 Activity: At Rest, SpO2 Device: O2 Device: None (Room air)       Invasive/non-invasive ventilation settings   Respiratory    Lab Data (Last 4 hours)    None         O2/Vent Data (Last 4 hours)    None                Physical Exam  Constitutional:       General: She is not in acute distress  Appearance: Normal appearance  HENT:      Head: Normocephalic and atraumatic  Eyes:      Pupils: Pupils are equal, round, and reactive to light  Cardiovascular:      Rate and Rhythm: Normal rate and regular rhythm  Pulses: Normal pulses  Pulmonary:      Effort: Pulmonary effort is normal  No respiratory distress  Breath sounds: Normal breath sounds  Abdominal:      General: Abdomen is flat  Bowel sounds are normal  There is no distension  Palpations: Abdomen is soft  Tenderness: There is no abdominal tenderness  Musculoskeletal: Normal range of motion  Right lower leg: No edema  Left lower leg: No edema  Skin:     General: Skin is warm and dry  Capillary Refill: Capillary refill takes less than 2 seconds  Neurological:      Mental Status: She is alert  GCS: GCS eye subscore is 4  GCS verbal subscore is 5  GCS motor subscore is 6           Laboratory and Diagnostics:  Results from last 7 days   Lab Units 08/09/20  2030   WBC Thousand/uL 8 22   HEMOGLOBIN g/dL 13 5   HEMATOCRIT % 41 1   PLATELETS Thousands/uL 229   NEUTROS PCT % 63   MONOS PCT % 7     Results from last 7 days   Lab Units 08/09/20 2041   SODIUM mmol/L 146*   POTASSIUM mmol/L 3 4*   CHLORIDE mmol/L 108   CO2 mmol/L 26   ANION GAP mmol/L 12   BUN mg/dL 9   CREATININE mg/dL 0 68   CALCIUM mg/dL 8 9   GLUCOSE RANDOM mg/dL 106   ALT U/L 24   AST U/L 18   ALK PHOS U/L 73   ALBUMIN g/dL 3 6   TOTAL BILIRUBIN mg/dL 0 20               Results from last 7 days   Lab Units 08/09/20 2041 TROPONIN I ng/mL <0 02         ABG:    VBG:          Micro        EKG: Sinus rhythm  Imaging: No imaging    Intake and Output  I/O       08/08 0701 - 08/09 0700 08/09 0701 - 08/10 0700    I V  (mL/kg)  1381 3 (21 1)    Total Intake(mL/kg)  1381 3 (21 1)    Net  +1381 3                Height and Weights   Height: 5' 3" (160 cm)  IBW: 52 4 kg  Body mass index is 25 58 kg/m²  Weight (last 2 days)     Date/Time   Weight    08/09/20 2300   65 5 (144 4)    08/09/20 1956   53 5 (118)                Nutrition       Diet Orders   (From admission, onward)             Start     Ordered    08/09/20 2226  Diet Regular; Finger Foods  Diet effective now     Question Answer Comment   Diet Type Regular    Regular Finger Foods    RD to adjust diet per protocol?  Yes        08/09/20 2225                  Active Medications  Scheduled Meds:  Current Facility-Administered Medications   Medication Dose Route Frequency Provider Last Rate    fluticasone  1 spray Nasal Daily Swati Bannister, CRNP      loratadine  10 mg Oral Daily Swati Bannister, CRNP      multi-electrolyte  125 mL/hr Intravenous Continuous Swati Bannister, CRNP 125 mL/hr (08/09/20 2057)    potassium chloride  40 mEq Oral Q4H Swati Bannister, CRNP       Continuous Infusions:  multi-electrolyte, 125 mL/hr, Last Rate: 125 mL/hr (08/09/20 2057)      PRN Meds:        Invasive Devices Review  Invasive Devices     Peripheral Intravenous Line            Peripheral IV 08/09/20 Left Antecubital less than 1 day    Peripheral IV 08/09/20 Right Forearm less than 1 day                Rationale for remaining devices: IV access  ---------------------------------------------------------------------------------------  Advance Directive and Living Will:      Power of :    POLST:    ---------------------------------------------------------------------------------------  Care Time Delivered:   No Critical Care time spent       JUAN CARLOS Stokes      Portions of the record may have been created with voice recognition software  Occasional wrong word or "sound a like" substitutions may have occurred due to the inherent limitations of voice recognition software    Read the chart carefully and recognize, using context, where substitutions have occurred

## 2020-08-10 NOTE — SOCIAL WORK
CM met with pt at bedside  OBS status reviewed and signed by pt  Copy to pt and copy to MR for scanning  Pt was living with her boyfriend, but just broke up with him  She is planning on moving to [place] and staying with her mother on discharge  This house is 2 stories with 13 steps w/railing to reach her bedroom and no DHRUV  Pt has no problem navigating steps and is independent with ADL's  She uses no DME's  She was in rehab for alcohol abuse in 2017[facility]  She was clean until this break-up with her boyfriend  Denies drug abuse  She has depression and anxiety that is treated with medication by her psychiatrist   Her PCP is Dr Toña Chiu  She is a smoker-PPD-1+ x 15 years  She uses [facility] and has no problem with co-pays  She does not have a POA or Advanced Directive and she does not want info at this time  She works and drives  Her mother or ex-boyfriend will transport home when she is medically cleared  No needs were identified  Pt will have to be cleared by psych before discharge  CM will continue to follow  CM reviewed discharge planning process including the following: identifying help at home, patient preference for discharge planning needs, pharmacy preference, and availability of treatment team to discuss questions or concerns patient and/or family may have regarding understanding medications and recognizing signs and symptoms once discharged  CM also encouraged patient to follow up with all recommended appointments after discharge  Patient advised of importance for patient and family to participate in managing patients medical well being
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
## 2020-08-10 NOTE — H&P
H&P- Xavier Venegas 1990, 30 y o  female MRN: [id number]    Unit/Bed#: ED 22 Encounter: [id number]    Primary Care Provider: No primary care provider on file  Date and time admitted to hospital: 2020  7:45 PM    Hypokalemia  Assessment & Plan  · Replete and recheck in AM    Depression  Assessment & Plan  · Hold home medications at present  · Psychiatry consult in AM    Acute encephalopathy  Assessment & Plan  · Improving  · Neuro exams as needed    * Polysubstance overdose  Assessment & Plan  · Continue fluid support  · Follow QTc  · Appreciate toxicology recommendations  · Psychiatry consult in AM      -------------------------------------------------------------------------------------------------------------  Chief Complaint: Sadness    History of Present Illness   HX and PE limited by: None  Xavier Venegas is a 30 y o  female who presents after an intentional overdose of her home Abilify/trazodone, possibly her home Prozac,  decongestant, small amount of alcohol  She reports the past medical history of depression and ongoing nicotine abuse  The patient reports that she just had a break-up with her boyfriend, he said mean spirited things to her which made her feel sad and she proceeded to take extra of her medications  She denies that this was a suicide attempt continues to endorse feeling sad  Toxicology was consulted who recommended fluid resuscitation  Due to the overdose, she is being referred to the step-down unit for monitoring  History obtained from the patient   -------------------------------------------------------------------------------------------------------------  Dispo: Admit to Stepdown Level 2    Code Status: No Order  --------------------------------------------------------------------------------------------------------------  Review of Systems   Constitutional: Positive for fatigue  Negative for activity change, appetite change and fever     HENT: Negative for trouble swallowing  Respiratory: Negative for shortness of breath  Cardiovascular: Negative for chest pain  Gastrointestinal: Negative for abdominal pain  Genitourinary: Negative for difficulty urinating  Musculoskeletal: Negative for back pain  Neurological: Negative for syncope, weakness, light-headedness and headaches  Psychiatric/Behavioral: Positive for confusion and dysphoric mood  Negative for self-injury and suicidal ideas  Physical Exam  HENT:      Head: Normocephalic and atraumatic  Eyes:      Pupils: Pupils are equal, round, and reactive to light  Neck:      Musculoskeletal: Normal range of motion  Cardiovascular:      Rate and Rhythm: Normal rate and regular rhythm  Pulses: Normal pulses  Pulmonary:      Effort: Pulmonary effort is normal  No respiratory distress  Breath sounds: Normal breath sounds  Abdominal:      General: Abdomen is flat  There is no distension  Palpations: Abdomen is soft  Tenderness: There is no abdominal tenderness  Musculoskeletal: Normal range of motion  Right lower leg: No edema  Left lower leg: No edema  Skin:     General: Skin is warm and dry  Capillary Refill: Capillary refill takes less than 2 seconds  Neurological:      Mental Status: She is easily aroused  She is lethargic and disoriented  GCS: GCS eye subscore is 3  GCS verbal subscore is 4  GCS motor subscore is 6  Psychiatric:         Mood and Affect: Affect is labile  Thought Content: Thought content does not include suicidal ideation  Thought content does not include suicidal plan  Cognition and Memory: Memory is not impaired         --------------------------------------------------------------------------------------------------------------  Vitals:   Vitals:    08/09/20 2027 08/09/20 2100 08/09/20 2130 08/09/20 2156   BP: 98/53 93/53 (!) 88/54 109/60   BP Location:  Right arm Right arm    Pulse: 73 66 68 82   Resp: 12 17 19 16   Temp:       TempSrc:       SpO2: 98% 98% 98% 98%   Weight:         Temp  Min: 98 1 °F (36 7 °C)  Max: 98 1 °F (36 7 °C)  IBW: -92 5 kg     Body mass index is 20 9 kg/m²      Laboratory and Diagnostics:  Results from last 7 days   Lab Units 08/09/20  2030   WBC Thousand/uL 8 22   HEMOGLOBIN g/dL 13 5   HEMATOCRIT % 41 1   PLATELETS Thousands/uL 229   NEUTROS PCT % 63   MONOS PCT % 7     Results from last 7 days   Lab Units 08/09/20  2041   SODIUM mmol/L 146*   POTASSIUM mmol/L 3 4*   CHLORIDE mmol/L 108   CO2 mmol/L 26   ANION GAP mmol/L 12   BUN mg/dL 9   CREATININE mg/dL 0 68   CALCIUM mg/dL 8 9   GLUCOSE RANDOM mg/dL 106   ALT U/L 24   AST U/L 18   ALK PHOS U/L 73   ALBUMIN g/dL 3 6   TOTAL BILIRUBIN mg/dL 0 20               Results from last 7 days   Lab Units 08/09/20  2041   TROPONIN I ng/mL <0 02         ABG:    VBG:          Micro:        EKG:  Sinus rhythm  Imaging: No imaging obtained      Historical Information   Past Medical History:   Diagnosis Date    Bipolar 1 disorder (HCC)     PMDD (premenstrual dysphoric disorder)     PTSD (post-traumatic stress disorder)      Past Surgical History:   Procedure Laterality Date    TUBAL LIGATION       Social History   Social History     Substance and Sexual Activity   Alcohol Use Yes    Frequency: Monthly or less     Social History     Substance and Sexual Activity   Drug Use Never     Social History     Tobacco Use   Smoking Status Never Smoker   Smokeless Tobacco Never Used     Exercise History:  Independent ADLs  Family History:   Family History   Problem Relation Age of Onset    No Known Problems Mother     No Known Problems Father      I have reviewed this patient's family history and commented on sigificant items within the HPI      Medications:  Current Facility-Administered Medications   Medication Dose Route Frequency    multi-electrolyte (ISOLYTE-S PH 7 4) bolus 1,000 mL  1,000 mL Intravenous Once    multi-electrolyte (PLASMALYTE-A/ISOLYTE-S PH 7 4) IV solution  125 mL/hr Intravenous Continuous     Home medications:  Prior to Admission Medications   Prescriptions Last Dose Informant Patient Reported? Taking? ARIPiprazole (ABILIFY) 10 mg tablet   Yes No   Sig: Take 10 mg by mouth daily   FLUoxetine (PROzac) 40 MG capsule   Yes No   Sig: Take 40 mg by mouth daily   fluticasone (Flonase) 50 mcg/act nasal spray   No No   Si spray into each nostril daily   loratadine (CLARITIN) 10 mg tablet   No No   Sig: Take 1 tablet (10 mg total) by mouth daily   traZODone (DESYREL) 50 mg tablet   Yes No   Sig: Take 50 mg by mouth daily at bedtime      Facility-Administered Medications: None     Allergies: Allergies   Allergen Reactions    Amoxicillin Hives    Clindamycin Hives       ------------------------------------------------------------------------------------------------------------  Advance Directive and Living Will:      Power of :    POLST:    ------------------------------------------------------------------------------------------------------------  Anticipated Length of Stay is > 2 midnights    Care Time Delivered:   No Critical Care time spent       JUAN CARLOS Garrison        Portions of the record may have been created with voice recognition software  Occasional wrong word or "sound a like" substitutions may have occurred due to the inherent limitations of voice recognition software    Read the chart carefully and recognize, using context, where substitutions have occurred

## 2020-08-10 NOTE — UTILIZATION REVIEW
Initial Clinical Review    ADMIT OBS  On    @  2132    CHANGED to INPT status on    @  1617  Ongoing monitoring/repletion of electrolytes;  Establishment of SAFE dcp  (inpt psych)     Inpatient Admission  Once      Transfer Service: General Medicine       Question  Answer  Comment    Admitting Physician  Juventino Ruvalcaba     Level of Care  Med Surg     Estimated length of stay  More than 2 Midnights     Certification  I certify that inpatient services are medically necessary for this patient for a duration of greater than two midnights  See H&P and MD Progress Notes for additional information about the patient's course of treatment  ED Arrival Information     Expected Arrival Acuity Means of Arrival Escorted By Service Admission Type    - 2020 19:45 Emergent Ambulance [address]  [facility] Critical Care/ICU Emergency    Arrival Complaint    SUICIDAL        Chief Complaint   Patient presents with    Psychiatric Evaluation     EMS reports that mother called police in regards to "suicidal text messages sent" to her by pt  in those text messages, pt reports "wanting to end life" pt admits to drinking an "entire bottle of children decongestant" pt states to have taken "3 trazadone and a bunch of other pills" pt +ETOH    Overdose - Intentional     pt took mulitple "uknown pills" reports 3 trazadone, 6/7 ambilify, bottole of childrens decongestant + ETOH     Assessment/Plan:    31 yo female,  To er via EMS,  Admitted OBS  Status - critical level of care for management of intentional overdose of   Abilify/trazodone, possibly  Prozac,  decongestant, small amount of alcohol  Pt denies SI, states she took meds because she felt "sad"  After breaking up w/boyfriend  Confused, dysphoric on presentation  Hypotensive in ER -  Given IVF resuscitation  Will continue telemetry & IVF resuscitation prn;  Consult toxicology;  perform serial neuro assessments;   Consult PSYCH;   Monitor/replete electrolytes prn       8/9  Toxicology consult:  Cont IVF boluses for hypotension - if not responsive then start epinephrine/norepi gtt  8/10  @  0400:  sbp 80's - 90's  Neurologically appropriate  Cont IVF support       8/11  Initiated atarax prn for anxiety -  Given @  66 65 76  HR 50s - 60's today  BP stabilizing         ED Triage Vitals   Temperature Pulse Respirations Blood Pressure SpO2   08/09/20 1956 08/09/20 1956 08/09/20 1956 08/09/20 1956 08/09/20 1956   98 1 °F (36 7 °C) 77 15 90/53 96 %      Temp Source Heart Rate Source Patient Position - Orthostatic VS BP Location FiO2 (%)   08/09/20 1956 08/09/20 1956 08/09/20 1956 08/09/20 1956 --   Oral Monitor Lying Right arm       Pain Score       08/09/20 2300       No Pain          Wt Readings from Last 1 Encounters:   08/10/20 65 3 kg (143 lb 15 4 oz)     Additional Vital Signs:   8/10/20 1100      67   18   103/65    97 %   Room air    08/10/20 0700      50Abnormal     18   109/71    92 %    08/10/20 0300   98 °F  55   23Abnormal     91/50    93 %    08/10/20 0000      60      87/48Abnormal    62  92 %    08/09/20 2300      60   16   85/50Abnormal    63  98 %    08/09/20 2130      68   19   88/54Abnormal    66  98 %    08/09/20 2100      66   17   93/53  68  98 %    08/09/20 2027      73   12   98/53    98 %    08/09/20 2015      68   17   87/50Abnormal    63  99 %   Room air        08/10/20 1500      62   18   100/55   68   96 %        08/11/20 1500   99 3 °F   68   18   119/67   87   97 %   None (Room air)   Sitting    08/11/20 07:48:10      56   18   113/71   85   96 %          08/10/20 23:01:42   98 2 °F   55   16   98/64   75   96 %          08/10/20 20:57:54      53Abnormal     16   102/67   79   95 %           08/09 0701   08/10 0700  08/10 0701   08/11 0700 08/11 0701 08/12 0700    P  O    670  1080    I V  (mL/kg)  1381 3 (21 2)  618 8 (9 5)     Total Intake(mL/kg)  1381 3 (21 2)  1288 8 (19 7)  1080 (16 5)    Urine (mL/kg/hr)  400  1050 (0 7)  700 (1)    Total Output  400  1050  700    Net  +981 3  +238 8  +380          Unmeasured Urine Occurrence    3 x        Pertinent Labs/Diagnostic Test Results:   8/10  ekg -  Normal sinus rhythm;  Possible Left atrial enlargement      Results from last 7 days   Lab Units 08/09/20 2014   SARS-COV-2  Negative     Results from last 7 days   Lab Units 08/09/20  2030   WBC Thousand/uL 8 22   HEMOGLOBIN g/dL 13 5   HEMATOCRIT % 41 1   PLATELETS Thousands/uL 229   NEUTROS ABS Thousands/µL 5 15         Results from last 7 days   Lab Units 08/10/20  0432 08/09/20  2041   SODIUM mmol/L 144 146*   POTASSIUM mmol/L 3 6 3 4*   CHLORIDE mmol/L 111* 108   CO2 mmol/L 28 26   ANION GAP mmol/L 5 12   BUN mg/dL 7 9   CREATININE mg/dL 0 58* 0 68   EGFR ml/min/1 73sq m 123 117   CALCIUM mg/dL 7 0* 8 9   MAGNESIUM mg/dL 2 2  --    PHOSPHORUS mg/dL 2 5*  --      Results from last 7 days   Lab Units 08/09/20  2041   AST U/L 18   ALT U/L 24   ALK PHOS U/L 73   TOTAL PROTEIN g/dL 6 8   ALBUMIN g/dL 3 6   TOTAL BILIRUBIN mg/dL 0 20     Results from last 7 days   Lab Units 08/09/20  2006   POC GLUCOSE mg/dl 99     Results from last 7 days   Lab Units 08/10/20  0432 08/09/20  2041   GLUCOSE RANDOM mg/dL 103 106     Results from last 7 days   Lab Units 08/09/20  2041   TROPONIN I ng/mL <0 02     Results from last 7 days   Lab Units 08/10/20  0558   CLARITY UA  Clear   COLOR UA  Yellow   SPEC GRAV UA  >=1 030   PH UA  6 0   GLUCOSE UA mg/dl Negative   KETONES UA mg/dl Negative   BLOOD UA  Negative   PROTEIN UA mg/dl Negative   NITRITE UA  Negative   BILIRUBIN UA  Negative   UROBILINOGEN UA E U /dl 0 2   LEUKOCYTES UA  Negative     Results from last 7 days   Lab Units 08/09/20  2041   ETHANOL LVL mg/dL 45*   ACETAMINOPHEN LVL ug/mL <2 5*   SALICYLATE LVL mg/dL 3 7     ED Treatment:   Medication Administration from 08/09/2020 1945 to 08/09/2020 2320       Date/Time Order Dose Route Action     08/09/2020 2014 sodium chloride 0 9 % bolus 1,000 mL 1,000 mL Intravenous New Bag     08/09/2020 2038 sodium chloride 0 9 % bolus 1,000 mL 1,000 mL Intravenous New Bag     08/09/2020 2057 multi-electrolyte (PLASMALYTE-A/ISOLYTE-S PH 7 4) IV solution 125 mL/hr Intravenous New Bag     08/09/2020 2304 multi-electrolyte (ISOLYTE-S PH 7 4) bolus 1,000 mL 1,000 mL Intravenous New Bag          Past Medical History:   Diagnosis Date    Bipolar 1 disorder ([facility] )     PMDD (premenstrual dysphoric disorder)     PTSD (post-traumatic stress disorder)      Present on Admission:   Polysubstance overdose   Acute encephalopathy   Depression   Hypokalemia      Admitting Diagnosis: Polysubstance overdose [T50 901A]  Drug-induced hypotension [I95 2]  Encounter for psychological evaluation [Z00 8]  Depression, unspecified depression type [F32 9]  Suicide attempt by drug overdose ([facility] 75 ) Ashley Oquendo  Age/Sex: 30 y o  female  Admission Orders:  Cardio/pulmonary monitoring;   Continuous 1:1 monitoring for patient safety;   Hourly VS;  I/O q 2 hr;  Neuro cks q 4 hr;  nsg dysphagia assessment prior to any po intake  IP CONSULT TO TOXICOLOGY  IP CONSULT TO CASE MANAGEMENT  IP CONSULT TO PSYCHIATRY      8/9  2328   PO KDur 40 meq    8/10  0427   PO Kdur 40 meq  0833   IV Ca gluconate  x1  Cont KPhos qid   IVF @  125 cc/hr         Scheduled Medications:  fluticasone, 1 spray, Nasal, Daily  loratadine, 10 mg, Oral, Daily  potassium-sodium phosphateS, 2 tablet, Oral, 4x Daily (with meals and at bedtime)      Continuous IV Infusions:  multi-electrolyte, 125 mL/hr, Intravenous, Continuous        Network Utilization Review Department  [email or web address]  ATTENTION: Please call with any questions or concerns to [phone] and carefully listen to the prompts so that you are directed to the right person   All voicemails are confidential   Karla Skipper all requests for admission clinical reviews, approved or denied determinations and any other requests to dedicated fax number below belonging to the campus where the patient is receiving treatment   List of dedicated fax numbers for the Facilities:  [address] [facility] DENIALS (Administrative/Medical Necessity) [phone]   [address]  (Maternity/NICU/Pediatrics) [phone]   Charlie Bolivar [phone]   Rach Campos [phone]   [facility] [phone]   Shasta Villegas [phone]   [address] [place] [address] [phone]   Francois Saha [phone]   [address] [facility] [address]  [address] [facility] [address] [facility] [phone]

## 2020-08-11 VITALS
HEIGHT: 63 IN | BODY MASS INDEX: 25.51 KG/M2 | TEMPERATURE: 99.3 F | WEIGHT: 143.96 LBS | OXYGEN SATURATION: 97 % | DIASTOLIC BLOOD PRESSURE: 67 MMHG | RESPIRATION RATE: 18 BRPM | SYSTOLIC BLOOD PRESSURE: 119 MMHG | HEART RATE: 68 BPM

## 2020-08-11 PROBLEM — F17.200 NICOTINE DEPENDENCE WITH CURRENT USE: Status: ACTIVE | Noted: 2020-08-11

## 2020-08-11 LAB
AMPHETAMINES SERPL QL SCN: NEGATIVE
BARBITURATES UR QL: NEGATIVE
BENZODIAZ UR QL: NEGATIVE
COCAINE UR QL: NEGATIVE
METHADONE UR QL: NEGATIVE
OPIATES UR QL SCN: NEGATIVE
OXYCODONE+OXYMORPHONE UR QL SCN: NEGATIVE
PCP UR QL: NEGATIVE
THC UR QL: NEGATIVE

## 2020-08-11 PROCEDURE — 99239 HOSP IP/OBS DSCHRG MGMT >30: CPT | Performed by: INTERNAL MEDICINE

## 2020-08-11 PROCEDURE — 99225 PR SBSQ OBSERVATION CARE/DAY 25 MINUTES: CPT | Performed by: INTERNAL MEDICINE

## 2020-08-11 PROCEDURE — 80307 DRUG TEST PRSMV CHEM ANLYZR: CPT | Performed by: INTERNAL MEDICINE

## 2020-08-11 RX ORDER — HYDROXYZINE 50 MG/1
25 TABLET, FILM COATED ORAL EVERY 8 HOURS PRN
Refills: 0
Start: 2020-08-11

## 2020-08-11 RX ORDER — HYDROXYZINE HYDROCHLORIDE 25 MG/1
25 TABLET, FILM COATED ORAL EVERY 6 HOURS PRN
Status: DISCONTINUED | OUTPATIENT
Start: 2020-08-11 | End: 2020-08-11 | Stop reason: HOSPADM

## 2020-08-11 RX ORDER — NICOTINE 21 MG/24HR
14 PATCH, TRANSDERMAL 24 HOURS TRANSDERMAL DAILY
Status: DISCONTINUED | OUTPATIENT
Start: 2020-08-11 | End: 2020-08-11 | Stop reason: HOSPADM

## 2020-08-11 RX ADMIN — LORATADINE 10 MG: 10 TABLET ORAL at 08:21

## 2020-08-11 RX ADMIN — NICOTINE 14 MG: 14 PATCH TRANSDERMAL at 10:36

## 2020-08-11 RX ADMIN — HYDROXYZINE HYDROCHLORIDE 25 MG: 25 TABLET ORAL at 14:49

## 2020-08-11 NOTE — ED NOTES
Priscilla Castro at [facility] requesting UDS, COVID results, and Urine Preg  Requested UDS as it is not in labs      Krystal Doss LMSW  08/11/20  [id number]

## 2020-08-11 NOTE — SOCIAL WORK
LOS 1 DAY  RISK OF UNPLANNED READMISSION SCORE N/A  30 DAY READMISSION: NO  BUNDLE: NO    302 petitioned by Dr Eduardo Sanchez and upheld by Dr Mirian Briggs  CM requested crisis consult and is sending handoff at this time

## 2020-08-11 NOTE — SOCIAL WORK
LOS 1 DAY  RISK OF UNPLANNED READMISSION SCORE N/A  30 DAY READMISSION: NO  BUNDLE: NO    CM requested UDS from [facility] per Crisis request  CM available through discharge

## 2020-08-11 NOTE — ED NOTES
Patient is accepted at [facility]  Patient is accepted by Dr Tyrone Pollard per Tristen  Transportation is arranged with [facility] via [address]  Transportation is scheduled for 19:00  Patient may go to the floor at anytime  CW informed both Emily of [facility] and Anh Olivera, pt's nurse of pt's ETA  CW faxed pt's [phone] petition, Rights, CRF and ACT 77 to [facility]  CW delivered packet containing original 302 petition and pt's chart to Anh Olivera on MS4 to accompany pt to [facility]      ETELVINA Ball [facility]  08/11/20 17:47

## 2020-08-11 NOTE — SOCIAL WORK
LOS 1 DAY  RISK OF UNPLANNED READMISSION SCORE N/A  30 DAY READMISSION: NO  BUNDLE: NO    Per rounding, patient presenting with polysubstance OD  Per psych consult, patient Dxs of Bipolar, PTSD, and DMDD  Psych recommending medical stabilization followed by 302 petition for Involuntary Psychiatric admission  Patient will need to be evaluated by Psychiatry for Part VI completion once the 302 process has been started  CM to s/w [facility] regarding petition for 302

## 2020-08-11 NOTE — SOCIAL WORK
LOS 1 DAY  RISK OF UNPLANNED READMISSION SCORE N/A  30 DAY READMISSION: NO  BUNDLE: NO    UDS results normal  Per UR, patient qualifies for INPT status if unable to transfer to [facility] unit today   CM requested flip to INPT status via TT

## 2020-08-11 NOTE — DISCHARGE SUMMARY
Discharge Summary - [facility] Internal Medicine    Patient Information: Kim Giordano 30 y o  female MRN: [id number]  Unit/Bed#: -01 Encounter: [id number]    Discharging Physician / Practitioner: Smitha Edmonds MD  PCP: No primary care provider on file  Admission Date: 8/9/2020  Discharge Date: 08/11/20    Reason for Admission:  Polysubstance overdose    Discharge Diagnoses:     Principal Problem:    Polysubstance overdose  Active Problems:    Acute encephalopathy    Depression    Hypokalemia    Nicotine dependence with current use  Resolved Problems:    * No resolved hospital problems  *    Present on Admission:   Polysubstance overdose   Acute encephalopathy   Depression   Hypokalemia   Nicotine dependence with current use    Consultations During Hospital Stay:  · Behavior health  · Medical toxicology    Procedures Performed:     · Nothing invasive    Significant Findings:     · As above    Incidental Findings:   · As above     Test Results Pending at Discharge (will require follow up): · None     Outpatient Tests Requested:  · None    Complications:  None    Hospital Course:     Kim Giordano is a 30 y o  female patient who originally presented to the hospital on 8/9/2020 due to polysubstance overdose  Please refer to admission history and physical and consultation reports as I am called in to discharge this patient as crisis has found a bed for her inpatient psychiatric unit  Please refer to note from earlier today regarding this patient discharge planning  Patient basically was resuscitated with IV fluids for her overdose  She has been medically clear to go to inpatient psych unit for suicidal attempt  Patient's medications to be adjusted by inpatient psychiatrist at the facility [facility] [place] as I have resumed her home medications on discharge and they would need to be addressed at the facility accordingly      Condition at Discharge: good     Discharge Day Visit / Exam:     Subjective: Patient sitting up in the bed  She feels fine  Vitals: Blood Pressure: 119/67 (08/11/20 1500)  Pulse: 68 (08/11/20 1500)  Temperature: 99 3 °F (37 4 °C) (08/11/20 1500)  Temp Source: Oral (08/11/20 1500)  Respirations: 18 (08/11/20 1500)  Height: 5' 3" (160 cm) (08/09/20 2300)  Weight - Scale: 65 3 kg (143 lb 15 4 oz) (08/11/20 0600)  SpO2: 97 % (08/11/20 1500)  Exam:        Vital signs are reviewed as above  Please refer to note from earlier today regarding patient's full examination          Discharge instructions/Information to patient and family:   See after visit summary for information provided to patient and family  Provisions for Follow-Up Care:  See after visit summary for information related to follow-up care and any pertinent home health orders  Disposition:     Inpatient Psychiatry at [place], [place]    For Discharges to [facility] SNF:   · Not Applicable to this Patient - Not Applicable to this Patient    Planned Readmission:  None     Discharge Statement:  I spent more than 30 minutes discharging the patient  This time was spent on the day of discharge  I had direct contact with the patient on the day of discharge  Greater than 50% of the total time was spent examining patient, answering all patient questions, arranging and discussing plan of care with patient as well as directly providing post-discharge instructions  Additional time then spent on discharge activities  Discharge Medications:  See after visit summary for reconciled discharge medications provided to patient and family  ** Please Note: Dragon 360 Dictation voice to text software may have been used in the creation of this document   **

## 2020-08-11 NOTE — PROGRESS NOTES
Progress Note - Yahaira Alvarez 1990, 30 y o  female MRN: [id number]    Unit/Bed#: -01 Encounter: [id number]    Primary Care Provider: No primary care provider on file  Date and time admitted to hospital: 2020  7:45 PM        Nicotine dependence with current use  Assessment & Plan  Nicotine patch    Hypokalemia  Assessment & Plan  Resolved    Depression  Assessment & Plan  Hold home meds in the setting of OD    Acute encephalopathy  Assessment & Plan  Has now completely resolved  Patient alert oriented x3    * Polysubstance overdose  Assessment & Plan  Significantly improved  Patient now alert oriented x3  Psychiatry evaluated yesterday patient will be placed inpatient psych as now medically stable for discharge        VTE Pharmacologic Prophylaxis:   Pharmacologic: Pharmacologic VTE Prophylaxis contraindicated due to Low risk  Mechanical VTE Prophylaxis in Place: Yes    Patient Centered Rounds: I have performed bedside rounds with nursing staff today  Discussions with Specialists or Other Care Team Provider:  Cm, nursing    Education and Discussions with Family / Patient:  Patient    Time Spent for Care: 30 minutes  More than 50% of total time spent on counseling and coordination of care as described above  Current Length of Stay: 0 day(s)    Current Patient Status: Observation   Certification Statement: The patient will continue to require additional inpatient hospital stay due to Medically stable for discharge awaiting placement to psych    Discharge Plan:  Medically stable for discharge awaiting placement inpatient psych    Code Status: Level 1 - Full Code      Subjective:   Patient seen examined  No acute overnight events    Denies any chest pain, shortness breath, abdominal pain, nausea, fevers, chills, headache, dysuria, or any other symptoms at this time    Objective:     Vitals:   Temp (24hrs), Av 2 °F (36 8 °C), Min:98 2 °F (36 8 °C), Max:98 2 °F (36 8 °C)    Temp:  [98 2 °F (36 8 °C)] 98 2 °F (36 8 °C)  HR:  [53-78] 56  Resp:  [16-18] 18  BP: ()/(55-71) 113/71  SpO2:  [95 %-97 %] 96 %  Body mass index is 25 5 kg/m²  Input and Output Summary (last 24 hours): Intake/Output Summary (Last 24 hours) at 8/11/2020 2689  Last data filed at 8/11/2020 0915  Gross per 24 hour   Intake 1030 ml   Output 1000 ml   Net 30 ml       Physical Exam:     Physical Exam  Constitutional:       General: She is not in acute distress  Appearance: She is well-developed  She is not diaphoretic  HENT:      Head: Normocephalic and atraumatic  Nose: Nose normal       Mouth/Throat:      Pharynx: No oropharyngeal exudate  Eyes:      General: No scleral icterus  Right eye: No discharge  Left eye: No discharge  Conjunctiva/sclera: Conjunctivae normal       Pupils: Pupils are equal, round, and reactive to light  Neck:      Musculoskeletal: Normal range of motion and neck supple  Thyroid: No thyromegaly  Vascular: No JVD  Cardiovascular:      Rate and Rhythm: Normal rate and regular rhythm  Heart sounds: Normal heart sounds  No murmur  No friction rub  No gallop  Pulmonary:      Effort: Pulmonary effort is normal  No respiratory distress  Breath sounds: Normal breath sounds  No wheezing or rales  Chest:      Chest wall: No tenderness  Abdominal:      General: Bowel sounds are normal  There is no distension  Palpations: Abdomen is soft  Tenderness: There is no abdominal tenderness  There is no guarding or rebound  Musculoskeletal: Normal range of motion  General: No tenderness or deformity  Skin:     General: Skin is warm and dry  Findings: No erythema or rash  Neurological:      Mental Status: She is alert and oriented to person, place, and time  Cranial Nerves: No cranial nerve deficit  Sensory: No sensory deficit  Motor: No abnormal muscle tone        Coordination: Coordination normal  Additional Data:     Labs:    Results from last 7 days   Lab Units 08/09/20  2030   WBC Thousand/uL 8 22   HEMOGLOBIN g/dL 13 5   HEMATOCRIT % 41 1   PLATELETS Thousands/uL 229   NEUTROS PCT % 63   LYMPHS PCT % 26   MONOS PCT % 7   EOS PCT % 3     Results from last 7 days   Lab Units 08/10/20  0432 08/09/20  2041   SODIUM mmol/L 144 146*   POTASSIUM mmol/L 3 6 3 4*   CHLORIDE mmol/L 111* 108   CO2 mmol/L 28 26   BUN mg/dL 7 9   CREATININE mg/dL 0 58* 0 68   ANION GAP mmol/L 5 12   CALCIUM mg/dL 7 0* 8 9   ALBUMIN g/dL  --  3 6   TOTAL BILIRUBIN mg/dL  --  0 20   ALK PHOS U/L  --  73   ALT U/L  --  24   AST U/L  --  18   GLUCOSE RANDOM mg/dL 103 106         Results from last 7 days   Lab Units 08/09/20 2006   POC GLUCOSE mg/dl 99                   * I Have Reviewed All Lab Data Listed Above  * Additional Pertinent Lab Tests Reviewed: All Labs Within Last 24 Hours Reviewed    Imaging:    Imaging Reports Reviewed Today Include: NA  Imaging Personally Reviewed by Myself Includes:  NA    Recent Cultures (last 7 days):           Last 24 Hours Medication List:   Current Facility-Administered Medications   Medication Dose Route Frequency Provider Last Rate    fluticasone  1 spray Nasal Daily Sergio Ashby PA-C      loratadine  10 mg Oral Daily Sergio Ashby PA-C      nicotine  14 mg Transdermal Daily Karl Gray MD          Today, Patient Was Seen By: Karl Gray MD    ** Please Note: Dictation voice to text software may have been used in the creation of this document   **

## 2020-08-11 NOTE — ED NOTES
Insurance Authorization for admission:   Phone call placed to [facility]  Phone number: [phone]  Spoke to Blandford Prey  14 days approved  Level of care: 302  Review on 8/24/2020  Authorization # [id number]  EVS (Eligibility Verification System) called - [phone]  Automated system indicates: MA eligible  Insurance Authorization for Transportation:      No transport auth needed as pt will be transported via [facility]      Ray Pak, [address]  08/11/20 17:18

## 2020-08-11 NOTE — ED NOTES
Call to [address], spoke with Melvi Omalley, who confirmed that they received clinical and it's being reviewed        Irasema Cortez, GRAYSON  08/11/20  1336

## 2020-08-11 NOTE — PLAN OF CARE
Problem: CARDIOVASCULAR - ADULT  Goal: Maintains optimal cardiac output and hemodynamic stability  Description: INTERVENTIONS:  - Monitor I/O, vital signs and rhythm  - Monitor for S/S and trends of decreased cardiac output  - Administer and titrate ordered vasoactive medications to optimize hemodynamic stability  - Assess quality of pulses, skin color and temperature  - Assess for signs of decreased coronary artery perfusion  - Instruct patient to report change in severity of symptoms  Outcome: Progressing     Problem: COPING  Goal: Pt/Family able to verbalize concerns and demonstrate effective coping strategies  Description: INTERVENTIONS:  - Assist patient/family to identify coping skills, available support systems and cultural and spiritual values  - Provide emotional support, including active listening and acknowledgement of concerns of patient and caregivers  - Reduce environmental stimuli, as able  - Provide patient education  - Assess for spiritual pain/suffering and initiate spiritual care, including notification of Pastoral Care or elaine based community as needed  - Assess effectiveness of coping strategies  Outcome: Progressing  Goal: Will report anxiety at manageable levels  Description: INTERVENTIONS:  - Administer medication as ordered  - Teach and encourage coping skills  - Provide emotional support  - Assess patient/family for anxiety and ability to cope  Outcome: Progressing     Problem: DECISION MAKING  Goal: Pt/Family able to effectively weigh alternatives and participate in decision making related to treatment and care  Description: INTERVENTIONS:  - Identify decision maker  - Determine when there are differences among patient's view, family's view, and healthcare provider's view of patient condition and care goals  - Facilitate patient/family articulation of goals for care  - Help patient/family identify pros/cons of alternative solutions  - Provide information as requested by patient/family  - Respect patient/family rights related to privacy and sharing information   - Serve as a liaison between patient, family and health care team  - Initiate consults as appropriate (Ethics Team, Palliative Care, Family Care Conference, etc )  Outcome: Progressing     Problem: Depression - IP adult  Goal: Effects of depression will be minimized  Description: INTERVENTIONS:  - Assess impact of patient's symptoms on level of functioning, self-care needs and offer support as indicated  - Assess patient/family knowledge of depression, impact on illness and need for teaching  - Provide emotional support, presence and reassurance  - Assess for possible suicidal thoughts, ideation or self-harm  If patient expresses suicidal thoughts or statements do not leave alone, notify physician/AP immediately, initiate Suicide Precautions, and determine need for continual observation   - Initiate consults and referrals as appropriate (a mental health professional, Spiritual Care)  - Administer medication as ordered  Outcome: Progressing     Problem: SELF HARM  Goal: Effect of psychiatric condition will be minimized and patient will be protected from self harm  Description: INTERVENTIONS:  - Assess impact of patient's symptoms on level of functioning, self-care needs and offer support as indicated  - Assess patient/family knowledge of depression, impact on illness and need for teaching  - Provide emotional support, presence and reassurance  - Assess for possible suicidal thoughts, ideation or self-harm  If patient expresses suicidal thoughts or statements do not leave alone, notify physician/AP immediately, initiate suicide precautions, and determine need for continual observation    - initiate consults and referrals as appropriate (a mental health professional, Spiritual Care  Outcome: Progressing     Problem: SUBSTANCE USE/ABUSE  Goal: Will have no detox symptoms and will verbalize plan for changing substance-related behavior  Description: INTERVENTIONS:  - Monitor physical status and assess for symptoms of withdrawal  - Administer medication as ordered  - Provide emotional support with 1 on 1 interaction with staff  - Encourage recovery focused program/ addiction education  - Assess for verbalization of changing behaviors related to substance abuse  - Initiate consults and referrals as appropriate (Case Management, Spiritual Care, etc )  Outcome: Progressing  Goal: By discharge, will develop insight into their chemical dependency and sustain motivation to continue in recovery  Description: INTERVENTIONS:  - Attends all daily group sessions and scheduled AA groups  - Actively practices coping skills through participation in the therapeutic community and adherence to program rules  - Reviews and completes assignments from individual treatment plan  - Assist patient development of understanding of their personal cycle of addiction and relapse triggers  Outcome: Progressing  Goal: By discharge, patient will have ongoing treatment plan addressing chemical dependency  Description: INTERVENTIONS:  - Assist patient with resources and/or appointments for ongoing recovery based living  Outcome: Progressing     Problem: Potential for Falls  Goal: Patient will remain free of falls  Description: INTERVENTIONS:  - Assess patient frequently for physical needs  -  Identify cognitive and physical deficits and behaviors that affect risk of falls    -  [place] fall precautions as indicated by assessment   - Educate patient/family on patient safety including physical limitations  - Instruct patient to call for assistance with activity based on assessment  - Modify environment to reduce risk of injury  - Consider OT/PT consult to assist with strengthening/mobility  Outcome: Progressing

## 2020-08-11 NOTE — PLAN OF CARE
Problem: CARDIOVASCULAR - ADULT  Goal: Maintains optimal cardiac output and hemodynamic stability  Description: INTERVENTIONS:  - Monitor I/O, vital signs and rhythm  - Monitor for S/S and trends of decreased cardiac output  - Administer and titrate ordered vasoactive medications to optimize hemodynamic stability  - Assess quality of pulses, skin color and temperature  - Assess for signs of decreased coronary artery perfusion  - Instruct patient to report change in severity of symptoms  Outcome: Progressing     Problem: COPING  Goal: Pt/Family able to verbalize concerns and demonstrate effective coping strategies  Description: INTERVENTIONS:  - Assist patient/family to identify coping skills, available support systems and cultural and spiritual values  - Provide emotional support, including active listening and acknowledgement of concerns of patient and caregivers  - Reduce environmental stimuli, as able  - Provide patient education  - Assess for spiritual pain/suffering and initiate spiritual care, including notification of Pastoral Care or elaine based community as needed  - Assess effectiveness of coping strategies  Outcome: Progressing  Goal: Will report anxiety at manageable levels  Description: INTERVENTIONS:  - Administer medication as ordered  - Teach and encourage coping skills  - Provide emotional support  - Assess patient/family for anxiety and ability to cope  Outcome: Progressing     Problem: DECISION MAKING  Goal: Pt/Family able to effectively weigh alternatives and participate in decision making related to treatment and care  Description: INTERVENTIONS:  - Identify decision maker  - Determine when there are differences among patient's view, family's view, and healthcare provider's view of patient condition and care goals  - Facilitate patient/family articulation of goals for care  - Help patient/family identify pros/cons of alternative solutions  - Provide information as requested by patient/family  - Respect patient/family rights related to privacy and sharing information   - Serve as a liaison between patient, family and health care team  - Initiate consults as appropriate (Ethics Team, Palliative Care, Family Care Conference, etc )  Outcome: Progressing     Problem: Depression - IP adult  Goal: Effects of depression will be minimized  Description: INTERVENTIONS:  - Assess impact of patient's symptoms on level of functioning, self-care needs and offer support as indicated  - Assess patient/family knowledge of depression, impact on illness and need for teaching  - Provide emotional support, presence and reassurance  - Assess for possible suicidal thoughts, ideation or self-harm  If patient expresses suicidal thoughts or statements do not leave alone, notify physician/AP immediately, initiate Suicide Precautions, and determine need for continual observation   - Initiate consults and referrals as appropriate (a mental health professional, Spiritual Care)  - Administer medication as ordered  Outcome: Progressing     Problem: SELF HARM  Goal: Effect of psychiatric condition will be minimized and patient will be protected from self harm  Description: INTERVENTIONS:  - Assess impact of patient's symptoms on level of functioning, self-care needs and offer support as indicated  - Assess patient/family knowledge of depression, impact on illness and need for teaching  - Provide emotional support, presence and reassurance  - Assess for possible suicidal thoughts, ideation or self-harm  If patient expresses suicidal thoughts or statements do not leave alone, notify physician/AP immediately, initiate suicide precautions, and determine need for continual observation    - initiate consults and referrals as appropriate (a mental health professional, Spiritual Care  Outcome: Progressing     Problem: SUBSTANCE USE/ABUSE  Goal: Will have no detox symptoms and will verbalize plan for changing substance-related behavior  Description: INTERVENTIONS:  - Monitor physical status and assess for symptoms of withdrawal  - Administer medication as ordered  - Provide emotional support with 1 on 1 interaction with staff  - Encourage recovery focused program/ addiction education  - Assess for verbalization of changing behaviors related to substance abuse  - Initiate consults and referrals as appropriate (Case Management, Spiritual Care, etc )  Outcome: Progressing  Goal: By discharge, will develop insight into their chemical dependency and sustain motivation to continue in recovery  Description: INTERVENTIONS:  - Attends all daily group sessions and scheduled AA groups  - Actively practices coping skills through participation in the therapeutic community and adherence to program rules  - Reviews and completes assignments from individual treatment plan  - Assist patient development of understanding of their personal cycle of addiction and relapse triggers  Outcome: Progressing  Goal: By discharge, patient will have ongoing treatment plan addressing chemical dependency  Description: INTERVENTIONS:  - Assist patient with resources and/or appointments for ongoing recovery based living  Outcome: Progressing     Problem: Potential for Falls  Goal: Patient will remain free of falls  Description: INTERVENTIONS:  - Assess patient frequently for physical needs  -  Identify cognitive and physical deficits and behaviors that affect risk of falls    -  [place] fall precautions as indicated by assessment   - Educate patient/family on patient safety including physical limitations  - Instruct patient to call for assistance with activity based on assessment  - Modify environment to reduce risk of injury  - Consider OT/PT consult to assist with strengthening/mobility  Outcome: Progressing

## 2020-08-11 NOTE — ASSESSMENT & PLAN NOTE
Significantly improved  Patient now alert oriented x3  Psychiatry evaluated yesterday patient will be placed inpatient psych as now medically stable for discharge

## 2020-08-11 NOTE — NURSING NOTE
Pt discharged to [facility] in [facility] [address] with all belongings  Items stored by security were returned to transport team who picked her up  Medications were verified by patient and also handed off to [facility] who transported pt  Crisis in ED told this RN not to call report to receiving facility  Pt asked this rn to call family and make aware of where pt was being placed  This rn called pts mom, Tashia Dominguez and made her aware  All questions answered at this time

## 2020-08-11 NOTE — ED NOTES
Per Venus Russ in Intake at [facility] ([facility]), there are no current beds in network  Per Mercedes Nolan at [facility], they do not have any female beds at this time  Call to [address], spoke with [facility], who indicated that they have beds and will review  Clinical sent  Call placed to [facility], spoke with Jh, to log call for precert       George Dee, AJAY  08/11/20  [id number]

## 2020-08-11 NOTE — ED NOTES
302 petitioned by Dr Cecelia Gimenez, upheld by Dr Gen Montoya  Rights read to patient   CRF and ACT 77 completed by [facility]  08/11/20  2663

## 2020-08-11 NOTE — NURSING NOTE
Patient left with Angela Gonzales with all belongings  AVS and follow ups were reviewed with patient  She stated understanding of all education  Patient left via wheel chair with michelle

## 2020-08-11 NOTE — SOCIAL WORK
LOS 1 DAY  RISK OF UNPLANNED READMISSION SCORE N/A  30 DAY READMISSION: NO  BUNDLE: NO    302 form provided to SLIM  CM available

## 2020-08-12 NOTE — UTILIZATION REVIEW
Notification of Inpatient Admission/Inpatient Authorization Request   This is a Notification of Inpatient Admission for [address]  Be advised that this patient was admitted to our facility under Inpatient Status  Contact Roberto Jolley at [phone] for additional admission information  [facility] DEPT DEDICATED Huyen Khan [phone]  Patient Name:   Yady Liz   YOB: 1990       [address]   [address]:   [address]   Tax ID: [id number]  NPI: [id number] Attending Provider/NPI:  Phone:  Address: Serene Buerger, Dariusz Pinto [[id number]]  [phone]  Same as CHAR/Julia Ann 1106 of Service Code: 24     Place of Service Name:  [address] [place] [address]   Start Date: 8/11/20 1617 Discharge Date & Time: 8/11/2020  7:35 PM    Type of Admission: Inpatient Status Discharge Disposition   (if discharged): Non [facility]   Patient Diagnoses: Polysubstance overdose [T50 901A]  Drug-induced hypotension [I95 2]  Encounter for psychological evaluation [Z00 8]  Depression, unspecified depression type [F32 9]  Suicide attempt by drug overdose [facility]) Lv Jacobson     Orders: Admission Orders (From admission, onward)     Ordered        08/11/20 1617  Inpatient Admission  Once         08/09/20 2132  Place in Observation  Once                    Assigned Utilization Review Contact: Roberto Jolley  Utilization   Network Utilization Review Department  Phone: [phone]; Fax [phone]  Email: [email or web address]   ATTENTION PAYERS: Please call the assigned Utilization  directly with any questions or concerns ALL voicemails in the department are confidential  Send all requests for admission clinical reviews, approved or denied determinations and any other requests to dedicated fax number belonging to the campus where the patient is receiving treatment